# Patient Record
Sex: MALE | Race: WHITE | NOT HISPANIC OR LATINO | Employment: FULL TIME | URBAN - METROPOLITAN AREA
[De-identification: names, ages, dates, MRNs, and addresses within clinical notes are randomized per-mention and may not be internally consistent; named-entity substitution may affect disease eponyms.]

---

## 2019-11-08 ENCOUNTER — HOSPITAL ENCOUNTER (OUTPATIENT)
Dept: LAB | Facility: HOSPITAL | Age: 44
Discharge: HOME | End: 2019-11-08
Attending: PHYSICIAN ASSISTANT

## 2019-11-08 ENCOUNTER — OFFICE VISIT CONVERTED (OUTPATIENT)
Dept: FAMILY MEDICINE CLINIC | Facility: CLINIC | Age: 44
End: 2019-11-08
Attending: PHYSICIAN ASSISTANT

## 2019-11-08 LAB
APPEARANCE UR: CLEAR
BILIRUB UR QL: NEGATIVE
CK SERPL-CCNC: 209 U/L (ref 57–374)
COLOR UR: YELLOW
CONV COLLECTION SOURCE (UA): NORMAL
CONV UROBILINOGEN IN URINE BY AUTOMATED TEST STRIP: 0.2 {EHRLICHU}/DL (ref 0.1–1)
GLUCOSE UR QL: NEGATIVE MG/DL
HGB UR QL STRIP: NEGATIVE
KETONES UR QL STRIP: NEGATIVE MG/DL
LEUKOCYTE ESTERASE UR QL STRIP: NEGATIVE
NITRITE UR QL STRIP: NEGATIVE
PH UR STRIP.AUTO: 5 [PH] (ref 5–8)
PROT UR QL: NEGATIVE MG/DL
SP GR UR: 1.03 (ref 1–1.03)
TESTOST SERPL-MCNC: 267 NG/DL (ref 249–836)

## 2019-11-09 LAB — B BURGDOR IGG+IGM SER-ACNC: <0.91 ISR (ref 0–0.9)

## 2019-11-12 LAB
R RICKETTSI IGG SER QL IA: NEGATIVE
R RICKETTSI IGM TITR SER: 0.42 INDEX (ref 0–0.89)
TESTOSTERONE, FREE: 6.3 PG/ML (ref 6.8–21.5)

## 2019-12-20 ENCOUNTER — HOSPITAL ENCOUNTER (OUTPATIENT)
Dept: LAB | Facility: HOSPITAL | Age: 44
Discharge: HOME | End: 2019-12-20
Attending: PHYSICIAN ASSISTANT

## 2019-12-20 ENCOUNTER — OFFICE VISIT CONVERTED (OUTPATIENT)
Dept: FAMILY MEDICINE CLINIC | Facility: CLINIC | Age: 44
End: 2019-12-20
Attending: PHYSICIAN ASSISTANT

## 2019-12-20 LAB — PSA SERPL-MCNC: 1.22 NG/ML (ref 0–4)

## 2020-03-12 ENCOUNTER — HOSPITAL ENCOUNTER (OUTPATIENT)
Dept: LAB | Facility: HOSPITAL | Age: 45
Discharge: HOME | End: 2020-03-12
Attending: PHYSICIAN ASSISTANT

## 2020-03-12 LAB
ALBUMIN SERPL-MCNC: 3.9 G/DL (ref 3.5–5)
ALBUMIN/GLOB SERPL: 1.1 {RATIO} (ref 1.4–2.6)
ALP SERPL-CCNC: 59 U/L (ref 53–128)
ALT SERPL-CCNC: 50 U/L (ref 10–40)
ANION GAP SERPL CALC-SCNC: 20 MMOL/L (ref 8–19)
AST SERPL-CCNC: 44 U/L (ref 15–50)
BASOPHILS # BLD AUTO: 0.05 10*3/UL (ref 0–0.2)
BASOPHILS NFR BLD AUTO: 0.7 % (ref 0–3)
BILIRUB SERPL-MCNC: 0.35 MG/DL (ref 0.2–1.3)
BUN SERPL-MCNC: 15 MG/DL (ref 5–25)
BUN/CREAT SERPL: 14 {RATIO} (ref 6–20)
CALCIUM SERPL-MCNC: 9.3 MG/DL (ref 8.7–10.4)
CHLORIDE SERPL-SCNC: 106 MMOL/L (ref 99–111)
CONV ABS IMM GRAN: 0.02 10*3/UL (ref 0–0.2)
CONV CO2: 19 MMOL/L (ref 22–32)
CONV IMMATURE GRAN: 0.3 % (ref 0–1.8)
CONV TOTAL PROTEIN: 7.4 G/DL (ref 6.3–8.2)
CREAT UR-MCNC: 1.05 MG/DL (ref 0.7–1.2)
DEPRECATED RDW RBC AUTO: 46.9 FL (ref 35.1–43.9)
EOSINOPHIL # BLD AUTO: 0.26 10*3/UL (ref 0–0.7)
EOSINOPHIL # BLD AUTO: 3.6 % (ref 0–7)
ERYTHROCYTE [DISTWIDTH] IN BLOOD BY AUTOMATED COUNT: 14.4 % (ref 11.6–14.4)
GFR SERPLBLD BASED ON 1.73 SQ M-ARVRAT: >60 ML/MIN/{1.73_M2}
GLOBULIN UR ELPH-MCNC: 3.5 G/DL (ref 2–3.5)
GLUCOSE SERPL-MCNC: 130 MG/DL (ref 70–99)
HCT VFR BLD AUTO: 50.5 % (ref 42–52)
HGB BLD-MCNC: 16.3 G/DL (ref 14–18)
LYMPHOCYTES # BLD AUTO: 2.54 10*3/UL (ref 1–5)
LYMPHOCYTES NFR BLD AUTO: 34.9 % (ref 20–45)
MCH RBC QN AUTO: 28.7 PG (ref 27–31)
MCHC RBC AUTO-ENTMCNC: 32.3 G/DL (ref 33–37)
MCV RBC AUTO: 88.9 FL (ref 80–96)
MONOCYTES # BLD AUTO: 0.67 10*3/UL (ref 0.2–1.2)
MONOCYTES NFR BLD AUTO: 9.2 % (ref 3–10)
NEUTROPHILS # BLD AUTO: 3.73 10*3/UL (ref 2–8)
NEUTROPHILS NFR BLD AUTO: 51.3 % (ref 30–85)
NRBC CBCN: 0 % (ref 0–0.7)
OSMOLALITY SERPL CALC.SUM OF ELEC: 295 MOSM/KG (ref 273–304)
PLATELET # BLD AUTO: 233 10*3/UL (ref 130–400)
PMV BLD AUTO: 10.7 FL (ref 9.4–12.4)
POTASSIUM SERPL-SCNC: 4.3 MMOL/L (ref 3.5–5.3)
PSA SERPL-MCNC: 1.37 NG/ML (ref 0–4)
RBC # BLD AUTO: 5.68 10*6/UL (ref 4.7–6.1)
SODIUM SERPL-SCNC: 141 MMOL/L (ref 135–147)
TESTOST SERPL-MCNC: 502 NG/DL (ref 249–836)
WBC # BLD AUTO: 7.27 10*3/UL (ref 4.8–10.8)

## 2020-03-20 ENCOUNTER — OFFICE VISIT CONVERTED (OUTPATIENT)
Dept: FAMILY MEDICINE CLINIC | Facility: CLINIC | Age: 45
End: 2020-03-20
Attending: NURSE PRACTITIONER

## 2020-06-12 ENCOUNTER — OFFICE VISIT CONVERTED (OUTPATIENT)
Dept: FAMILY MEDICINE CLINIC | Facility: CLINIC | Age: 45
End: 2020-06-12
Attending: PHYSICIAN ASSISTANT

## 2020-07-17 ENCOUNTER — CONVERSION ENCOUNTER (OUTPATIENT)
Dept: GASTROENTEROLOGY | Facility: CLINIC | Age: 45
End: 2020-07-17
Attending: INTERNAL MEDICINE

## 2020-08-26 ENCOUNTER — HOSPITAL ENCOUNTER (OUTPATIENT)
Dept: PREADMISSION TESTING | Facility: HOSPITAL | Age: 45
Discharge: HOME | End: 2020-08-26
Attending: INTERNAL MEDICINE

## 2020-08-27 LAB — SARS-COV-2 RNA SPEC QL NAA+PROBE: NOT DETECTED

## 2020-08-31 ENCOUNTER — HOSPITAL ENCOUNTER (OUTPATIENT)
Dept: GASTROENTEROLOGY | Facility: HOSPITAL | Age: 45
Setting detail: HOSPITAL OUTPATIENT SURGERY
Discharge: HOME | End: 2020-08-31
Attending: INTERNAL MEDICINE

## 2020-09-08 ENCOUNTER — HOSPITAL ENCOUNTER (OUTPATIENT)
Dept: LAB | Facility: HOSPITAL | Age: 45
Discharge: HOME | End: 2020-09-08
Attending: PHYSICIAN ASSISTANT

## 2020-09-08 LAB
ALBUMIN SERPL-MCNC: 4.3 G/DL (ref 3.5–5)
ALBUMIN/GLOB SERPL: 1.3 {RATIO} (ref 1.4–2.6)
ALP SERPL-CCNC: 57 U/L (ref 53–128)
ALT SERPL-CCNC: 39 U/L (ref 10–40)
ANION GAP SERPL CALC-SCNC: 19 MMOL/L (ref 8–19)
AST SERPL-CCNC: 34 U/L (ref 15–50)
BASOPHILS # BLD AUTO: 0.06 10*3/UL (ref 0–0.2)
BASOPHILS NFR BLD AUTO: 0.9 % (ref 0–3)
BILIRUB SERPL-MCNC: 0.67 MG/DL (ref 0.2–1.3)
BUN SERPL-MCNC: 20 MG/DL (ref 5–25)
BUN/CREAT SERPL: 17 {RATIO} (ref 6–20)
CALCIUM SERPL-MCNC: 9.4 MG/DL (ref 8.7–10.4)
CHLORIDE SERPL-SCNC: 105 MMOL/L (ref 99–111)
CONV ABS IMM GRAN: 0.02 10*3/UL (ref 0–0.2)
CONV CO2: 18 MMOL/L (ref 22–32)
CONV IMMATURE GRAN: 0.3 % (ref 0–1.8)
CONV TOTAL PROTEIN: 7.6 G/DL (ref 6.3–8.2)
CREAT UR-MCNC: 1.19 MG/DL (ref 0.7–1.2)
DEPRECATED RDW RBC AUTO: 47.4 FL (ref 35.1–43.9)
EOSINOPHIL # BLD AUTO: 0.18 10*3/UL (ref 0–0.7)
EOSINOPHIL # BLD AUTO: 2.8 % (ref 0–7)
ERYTHROCYTE [DISTWIDTH] IN BLOOD BY AUTOMATED COUNT: 14.6 % (ref 11.6–14.4)
GFR SERPLBLD BASED ON 1.73 SQ M-ARVRAT: >60 ML/MIN/{1.73_M2}
GLOBULIN UR ELPH-MCNC: 3.3 G/DL (ref 2–3.5)
GLUCOSE SERPL-MCNC: 110 MG/DL (ref 70–99)
HCT VFR BLD AUTO: 52.7 % (ref 42–52)
HGB BLD-MCNC: 17.4 G/DL (ref 14–18)
LYMPHOCYTES # BLD AUTO: 2.05 10*3/UL (ref 1–5)
LYMPHOCYTES NFR BLD AUTO: 31.9 % (ref 20–45)
MCH RBC QN AUTO: 28.9 PG (ref 27–31)
MCHC RBC AUTO-ENTMCNC: 33 G/DL (ref 33–37)
MCV RBC AUTO: 87.4 FL (ref 80–96)
MONOCYTES # BLD AUTO: 0.59 10*3/UL (ref 0.2–1.2)
MONOCYTES NFR BLD AUTO: 9.2 % (ref 3–10)
NEUTROPHILS # BLD AUTO: 3.53 10*3/UL (ref 2–8)
NEUTROPHILS NFR BLD AUTO: 54.9 % (ref 30–85)
NRBC CBCN: 0 % (ref 0–0.7)
OSMOLALITY SERPL CALC.SUM OF ELEC: 289 MOSM/KG (ref 273–304)
PLATELET # BLD AUTO: 230 10*3/UL (ref 130–400)
PMV BLD AUTO: 10.5 FL (ref 9.4–12.4)
POTASSIUM SERPL-SCNC: 4.1 MMOL/L (ref 3.5–5.3)
PSA SERPL-MCNC: 1.38 NG/ML (ref 0–4)
RBC # BLD AUTO: 6.03 10*6/UL (ref 4.7–6.1)
SODIUM SERPL-SCNC: 138 MMOL/L (ref 135–147)
TESTOST SERPL-MCNC: 497 NG/DL (ref 249–836)
WBC # BLD AUTO: 6.43 10*3/UL (ref 4.8–10.8)

## 2020-09-11 ENCOUNTER — OFFICE VISIT CONVERTED (OUTPATIENT)
Dept: FAMILY MEDICINE CLINIC | Facility: CLINIC | Age: 45
End: 2020-09-11
Attending: PHYSICIAN ASSISTANT

## 2020-12-11 ENCOUNTER — HOSPITAL ENCOUNTER (OUTPATIENT)
Dept: LAB | Facility: HOSPITAL | Age: 45
Discharge: HOME | End: 2020-12-11
Attending: PHYSICIAN ASSISTANT

## 2020-12-11 LAB
BASOPHILS # BLD AUTO: 0.04 10*3/UL (ref 0–0.2)
BASOPHILS NFR BLD AUTO: 0.7 % (ref 0–3)
CONV ABS IMM GRAN: 0.02 10*3/UL (ref 0–0.2)
CONV IMMATURE GRAN: 0.3 % (ref 0–1.8)
DEPRECATED RDW RBC AUTO: 47 FL (ref 35.1–43.9)
EOSINOPHIL # BLD AUTO: 0.16 10*3/UL (ref 0–0.7)
EOSINOPHIL # BLD AUTO: 2.8 % (ref 0–7)
ERYTHROCYTE [DISTWIDTH] IN BLOOD BY AUTOMATED COUNT: 15.2 % (ref 11.6–14.4)
HCT VFR BLD AUTO: 51.8 % (ref 42–52)
HGB BLD-MCNC: 17 G/DL (ref 14–18)
LYMPHOCYTES # BLD AUTO: 1.88 10*3/UL (ref 1–5)
LYMPHOCYTES NFR BLD AUTO: 32.9 % (ref 20–45)
MCH RBC QN AUTO: 28.3 PG (ref 27–31)
MCHC RBC AUTO-ENTMCNC: 32.8 G/DL (ref 33–37)
MCV RBC AUTO: 86.3 FL (ref 80–96)
MONOCYTES # BLD AUTO: 0.68 10*3/UL (ref 0.2–1.2)
MONOCYTES NFR BLD AUTO: 11.9 % (ref 3–10)
NEUTROPHILS # BLD AUTO: 2.94 10*3/UL (ref 2–8)
NEUTROPHILS NFR BLD AUTO: 51.4 % (ref 30–85)
NRBC CBCN: 0 % (ref 0–0.7)
PLATELET # BLD AUTO: 211 10*3/UL (ref 130–400)
PMV BLD AUTO: 10.1 FL (ref 9.4–12.4)
PSA SERPL-MCNC: 1.38 NG/ML (ref 0–4)
RBC # BLD AUTO: 6 10*6/UL (ref 4.7–6.1)
TESTOST SERPL-MCNC: 520 NG/DL (ref 249–836)
WBC # BLD AUTO: 5.72 10*3/UL (ref 4.8–10.8)

## 2020-12-14 LAB — CONV ESTROGENS, TOTAL, SERUM: 183 PG/ML (ref 40–115)

## 2020-12-15 ENCOUNTER — OFFICE VISIT CONVERTED (OUTPATIENT)
Dept: FAMILY MEDICINE CLINIC | Facility: CLINIC | Age: 45
End: 2020-12-15
Attending: PHYSICIAN ASSISTANT

## 2021-04-01 ENCOUNTER — HOSPITAL ENCOUNTER (OUTPATIENT)
Dept: VACCINE CLINIC | Facility: HOSPITAL | Age: 46
Discharge: HOME | End: 2021-04-01
Attending: INTERNAL MEDICINE

## 2021-04-23 ENCOUNTER — HOSPITAL ENCOUNTER (OUTPATIENT)
Dept: VACCINE CLINIC | Facility: HOSPITAL | Age: 46
Discharge: HOME | End: 2021-04-23
Attending: INTERNAL MEDICINE

## 2021-05-10 NOTE — H&P
History and Physical      Patient Name: Hardik Woo   Patient ID: [id number]   Sex: Male   YOB: 1975    Primary Care Provider: Nathaniel Johnson PA-C   Referring Provider: Nathaniel Johnson PA-C    Visit Date: July 17, 2020    Provider: Prudencio Ghosh MD   Location: [facility]   Location Address: [address] [place] [address]  [id number]   Location Phone: [phone]          Chief Complaint  · Surgical History and Physical  · Screening Colonoscopy      History Of Present Illness  NON-INPATIENT HISTORY AND PHYSICAL  Allergies: NO KNOWN DRUG ALLERGIES   Chief Complaint/History of Present Illness: Screening Colonoscopy, History of Colon Polyps   Colon Recall: No   Failed Outpatient Treatment/Contraindications: N/A   Current Medications: Suprep Bowel Prep Kit 17.5-3.13-1.6 gram oral recon soln and testosterone cypionate 200 mg/mL intramuscular oil   Significant Past Medical History: Hypogonadism, male and Screening for colon cancer   Significant Family Medical History: No Family History of Colon Cancer   Significant Past Surgical History: Colonoscopy   Previous Colonoscopy: Yes YEAR: 2011 By Whom: Riki Pfeiffer MD   Previous EGD: No   PHYSICAL EXAM:  Heart: Regular Rate and Rhythm   Lungs: Breathing Unlabored           Assessment  · Preoperative examination     V72.84/Z01.818  · Screening for colon cancer     V76.51/Z12.11  · History of colonic polyps     V12.72/Z86.010      Plan  · Orders  o Consent for Colonoscopy Screening -Possible risk/complications, benefits, and alternatives to surgical or invasive procedure have been explained to patient and/or legal gaurdian. -Patient has been evaluated and can tolerate anethesia and/or sedation. Risk, benefits, and alternatives to anesthesia and sedation have been explained to patient or legal gaurdian. () - V72.84/Z01.818, V76.51/Z12.11, V12.72/Z86.010 - 08/31/2020  · Medications  o Medications have been Reconciled  o Transition of Care or Provider  Policy  · Instructions  o ****Surgical Orders****  o ***************  o Outpatient  o ***************  o RISK AND BENEFITS:  o Possible risks/complications, benefits and alternatives to surgical or invasive procedure have been explained to the patient and/or legal guardian.  o Patient has been evaluated and can tolerate anesthesia and/or sedation. Risks, benefits, and alternatives to anesthesia and sedation have been explained to the patient and/or legal guardian.  o ***************  o PREP: Per protocol  o IV: Per Anesthesia  o The above History and Physical Examination has been completed within 30 days of admission.  o This note has been transcribed by ALEJANDRO Arteaga MA. I have read and agree with the findings in this note.  o Electronically Identified Patient Education Materials Provided Electronically  · Disposition  o Call or Return if symptoms worsen or persist.            Electronically Signed by: John Arteaga, -Author on July 17, 2020 10:39:38 AM

## 2021-05-13 NOTE — PROGRESS NOTES
Progress Note      Patient Name: Hardik Woo   Patient ID: [id number]   Sex: Male   YOB: 1975    Primary Care Provider: Nathaniel Johnson PA-C   Referring Provider: Nathaniel Johnson PA-C    Visit Date: June 12, 2020    Provider: Nathaniel Johnson PA-C   [facility]   Location Address: [address] [place] [address], [address]  [place], [place]  [id number]   Location Phone: [phone]          Chief Complaint  · Left knee pain      History Of Present Illness  Hardik Woo is a 44 year old male who presents for evaluation and treatment of: left knee pain.      pt presents today for left knee pain.    pt stated 2 weeks ago ehile working in the yard he hurt his knee to the fact he was unable to bear weight on the leg. pt stated it is getting better over time, has been putting ice on it.  Patient states that he was getting up from working in his yard and he had instant pain in his knee left knee it nearly buckled on him he states that he was unable to walk for the next day.  He states improved a little bit over time but it continues to bother him.  It does wake him up when he rolls over.    Pt will restart testosterone injections    Pt also needs colon - screening since last was over 5 years ago.               Past Medical History  Disease Name Date Onset Notes   *No Pertinent Past Medical History --  --    Hypogonadism, male 12/22/2019 --    Screening for colon cancer 2014 --          Allergy List  Allergen Name Date Reaction Notes   NO KNOWN DRUG ALLERGIES --  --  --          Family Medical History  Disease Name Relative/Age Notes   Brain Neoplasm, Malignant Uncle/   --    Heart Disease Father/  Grandfather (maternal)/  Mother/   --    Diabetes, unspecified type Mother/   --          Social History  Finding Status Start/Stop Quantity Notes   Alcohol Light 21/-- --  03/20/2020 - 11/08/2019 - socially    Tobacco Never --/-- --  11/08/2019 -          Immunizations  NameDate Admin Mfg Trade Name Lot Number Route  "Inj VIS Given VIS Publication   Tdap11/08/2019 SKB BOOSTRIX [id number] IM LD 11/08/2019    Comments:          Review of Systems  · Constitutional  o Denies  o : fever, fatigue, weight loss, weight gain  · Cardiovascular  o Denies  o : lower extremity edema, claudication, chest pressure, palpitations  · Respiratory  o Denies  o : shortness of breath, wheezing, cough, hemoptysis, dyspnea on exertion  · Gastrointestinal  o Denies  o : nausea, vomiting, diarrhea, constipation, abdominal pain      Vitals  Date Time BP Position Site L\R Cuff Size HR RR TEMP (F) WT  HT  BMI kg/m2 BSA m2 O2 Sat HC       06/12/2020 10:51 /69 Sitting    90 - R   273lbs 2oz 5'  8\" 41.53 2.44 97 %          Physical Examination  · Constitutional  o Appearance  o : overweight, well developed  · Head and Face  o Head  o : normocephalic, atraumatic  · Ears, Nose, Mouth and Throat  o Ears  o :   § External Ears  § : external auditory canal appearance normal, no discharge present  § Otoscopic Examination  § : tympanic membranes pearly white/gray bilaterally  o Nose  o :   § External Nose  § : no lesions noted  § Nasopharynx  § : no discharge present  o Oral Cavity  o :   § Oral Mucosa  § : oral mucosa light pink  o Throat  o :   § Oropharynx  § : tonsils without exudate, no palatal petechiae  · Neck  o Inspection/Palpation  o : normal appearance, no masses or tenderness, trachea midline  o Thyroid  o : gland size normal, nontender, no nodules or masses present on palpation  · Respiratory  o Respiratory Effort  o : breathing unlabored  o Inspection of Chest  o : chest rise symmetric bilaterally  o Auscultation of Lungs  o : clear to auscultation bilaterally throughout inspiration and expiration  · Cardiovascular  o Heart  o :   § Auscultation of Heart  § : regular rate and rhythm, no murmurs, gallops or rubs  o Peripheral Vascular System  o :   § Extremities  § : no edema  · Lymphatic  o Neck  o : no cervical lymphadenopathy, no supraclavicular " lymphadenopathy  · Psychiatric  o Mood and Affect  o : mood normal, affect appropriate     Left Knee - full rom, left lateral aspect tend, no edema, no discoloration           Assessment  · Screening for colon cancer     V76.51/Z12.11  · Knee pain, left     719.46/M25.562  · Low testosterone in male     790.99/R79.89      Plan  · Orders  o COLONOSCOPY REFERRAL (COLON) - V76.51/Z12.11 - 06/12/2020  o CBC with Auto Diff [facility] ([id number]) - - 09/12/2020  o CMP [facility] ([id number]) - - 09/12/2020  o ACO-39: Current medications updated and reviewed () - - 06/12/2020  o ACO-14: Influenza immunization administered or previously received () - - 06/12/2020  o Knee (Left) 3 views X-Ray [facility] Preferred View ([id number]) - - 06/12/2020  o PSA ultrasensitive DIAGNOSTIC [facility] ([id number]) - - 09/12/2020  o Testosterone (Total) (35701) - - 09/12/2020  · Medications  o testosterone cypionate 200 mg/mL intramuscular oil   SIG: inject 1 milliliter by intramuscular route every 2 weeks   DISP: (2) 1 ml vial with 2 refills  Prescribed on 06/12/2020     o Medications have been Reconciled  o Transition of Care or Provider Policy  · Instructions  o Take all medications as prescribed/directed.  o Patient instructed/educated on their diet and exercise program.  o Patient was educated/instructed on their diagnosis, treatment and medications prior to discharge from the clinic today.  o Patient counseled to reduce calorie intake.  o Patient was instructed to exercise regularly.  o Discussed Covid-19 precautions including, but not limited to, social distancing, avoid touching your face, and hand washing.   · Disposition  o Call or Return if symptoms worsen or persist.  o F/U in 3 months.  o Care Transition            Electronically Signed by: Nathaniel Johnson PA-C -Author on June 12, 2020 01:32:24 PM

## 2021-05-13 NOTE — PROGRESS NOTES
Progress Note      Patient Name: Hardik Woo   Patient ID: [id number]   Sex: Male   YOB: 1975    Primary Care Provider: Nathaniel Johnson PA-C   Referring Provider: Nathaniel Johnson PA-C    Visit Date: September 11, 2020    Provider: Nathaniel Johnson PA-C   [facility]   Location Address: [address] [place] [address], [address]  [place], [place]  [id number]   Location Phone: [phone]          Chief Complaint  · 3 month follow up      History Of Present Illness  Hardik Woo is a 44 year old male who presents for evaluation and treatment of:      Patient is here today for a 3 month follow up about his testosterone injections. Patient has no other concerns to discuss.     Last labs were done on 9/7/20    No CP, SOA, HA    Pt will donate blood to keep hgb/hct undercontrol    He is feeling better.  Gives himself injection every two weeks.    UBALDO and UDS are utd             Past Medical History  Disease Name Date Onset Notes   *No Pertinent Past Medical History --  --    Hypogonadism, male 12/22/2019 --    Screening for colon cancer 2014 --          Past Surgical History  Procedure Name Date Notes   Colonoscopy 2011 2020 --          Medication List  Name Date Started Instructions   testosterone cypionate 200 mg/mL intramuscular oil 09/11/2020 inject 1 milliliter by intramuscular route every 2 weeks for 30 days         Allergy List  Allergen Name Date Reaction Notes   NO KNOWN DRUG ALLERGIES --  --  --          Family Medical History  Disease Name Relative/Age Notes   Brain Neoplasm, Malignant Uncle/   --    Heart Disease Father/  Grandfather (maternal)/  Mother/   --    Diabetes, unspecified type Mother/   --    - No Family History of Colorectal Cancer  --          Social History  Finding Status Start/Stop Quantity Notes   Alcohol Light 21/-- --  03/20/2020 - 11/08/2019 - socially    Tobacco Never --/-- --  11/08/2019 -          Immunizations  NameDate Admin Mfg Trade Name Lot Number  "Route Inj VIS Given VIS Publication   Tdap11/08/2019 SKB BOOSTRIX [id number] IM LD 11/08/2019    Comments:          Review of Systems  · Constitutional  o Admits  o : weight loss  o Denies  o : fever, fatigue, weight gain  · Cardiovascular  o Denies  o : lower extremity edema, claudication, chest pressure, palpitations  · Respiratory  o Denies  o : shortness of breath, wheezing, cough, hemoptysis, dyspnea on exertion  · Gastrointestinal  o Denies  o : nausea, vomiting, diarrhea, constipation, abdominal pain      Vitals  Date Time BP Position Site L\R Cuff Size HR RR TEMP (F) WT  HT  BMI kg/m2 BSA m2 O2 Sat HC       09/11/2020 03:43 /63 Sitting    79 - R   268lbs 8oz 5'  9\" 39.65 2.43 95 %          Physical Examination  · Constitutional  o Appearance  o : overweight, well developed  · Head and Face  o Head  o : normocephalic, atraumatic  · Neck  o Inspection/Palpation  o : normal appearance, no masses or tenderness, trachea midline  o Thyroid  o : gland size normal, nontender, no nodules or masses present on palpation  · Respiratory  o Respiratory Effort  o : breathing unlabored  o Inspection of Chest  o : chest rise symmetric bilaterally  o Auscultation of Lungs  o : clear to auscultation bilaterally throughout inspiration and expiration  · Cardiovascular  o Heart  o :   § Auscultation of Heart  § : regular rate and rhythm, no murmurs, gallops or rubs  o Peripheral Vascular System  o :   § Extremities  § : no edema  · Lymphatic  o Neck  o : no cervical lymphadenopathy, no supraclavicular lymphadenopathy  · Psychiatric  o Mood and Affect  o : mood normal, affect appropriate              Assessment  · Hypogonadism, male     257.2/E29.1  · Obesity     278.00/E66.9  · Low testosterone in male     790.99/R79.89    Problems Reconciled  Plan  · Orders  o PSA ultrasenstive diagnostic [facility] ([id number]) - 257.2/E29.1 - 12/11/2020  o Testosterone (Total) (78778) - 257.2/E29.1 - 12/11/2020  o Estrogen, total (65768) - 257.2/E29.1 - " 12/11/2020  o CBC with Auto Diff [facility] ([id number]) - 257.2/E29.1 - 12/11/2020  o ACO-39: Current medications updated and reviewed () - - 09/11/2020  · Medications  o testosterone cypionate 200 mg/mL intramuscular oil   SIG: inject 1 milliliter by intramuscular route every 2 weeks for 30 days   DISP: (2) 1 ml vial with 2 refills  Refilled on 09/11/2020     o Medications have been Reconciled  o Transition of Care or Provider Policy  · Instructions  o Take all medications as prescribed/directed.  o Patient instructed/educated on their diet and exercise program.  o Patient was educated/instructed on their diagnosis, treatment and medications prior to discharge from the clinic today.  o Patient counseled to reduce calorie intake.  o Patient was instructed to exercise regularly.  o Discussed Covid-19 precautions including, but not limited to, social distancing, avoid touching your face, and hand washing.   · Disposition  o Call or Return if symptoms worsen or persist.  o F/U in 3 months.  o Care Transition            Electronically Signed by: Nathaniel Johnson PA-C -Author on September 16, 2020 09:28:55 AM

## 2021-05-14 VITALS
HEIGHT: 69 IN | HEART RATE: 79 BPM | SYSTOLIC BLOOD PRESSURE: 105 MMHG | BODY MASS INDEX: 39.77 KG/M2 | WEIGHT: 268.5 LBS | DIASTOLIC BLOOD PRESSURE: 63 MMHG | OXYGEN SATURATION: 95 %

## 2021-05-14 VITALS
WEIGHT: 272.12 LBS | HEART RATE: 89 BPM | HEIGHT: 69 IN | DIASTOLIC BLOOD PRESSURE: 95 MMHG | SYSTOLIC BLOOD PRESSURE: 136 MMHG | BODY MASS INDEX: 40.3 KG/M2 | OXYGEN SATURATION: 97 %

## 2021-05-14 NOTE — PROGRESS NOTES
Progress Note      Patient Name: Hardik Woo   Patient ID: [id number]   Sex: Male   YOB: 1975    Primary Care Provider: Nathaniel Johnson PA-C   Referring Provider: Nathaniel Johnson PA-C    Visit Date: December 15, 2020    Provider: Nathaniel Johnson PA-C   Location: [facility]   Location Address: [address] [place] [address], [address]  [place], [place]  [id number]   Location Phone: [phone]          Chief Complaint  · routine follow up  · discuss labs      History Of Present Illness  Hardik Woo is a 45 year old male who presents for evaluation and treatment of: routine follow up.      pt presents today for routine follow up on hypogonadism.    pt states he would like to discuss estrogen labs.    pt states no new issues or complaints to discuss    Labs 12/20  flu 10/20    No CP, SOA, HA  Pt had recent labs which reveals elevated estrogen levels.    Pt is doing well overall    No CP, SOA, HA  Discussed testosterone use at length       Past Medical History  Disease Name Date Onset Notes   *No Pertinent Past Medical History --  --    Hypogonadism, male 12/22/2019 --    Screening for colon cancer 2014 --          Past Surgical History  Procedure Name Date Notes   Colonoscopy 2011 2020 --          Medication List  Name Date Started Instructions   testosterone cypionate 200 mg/mL intramuscular oil 12/14/2020 inject 1 milliliter by intramuscular route every 2 weeks for 30 days         Allergy List  Allergen Name Date Reaction Notes   NO KNOWN DRUG ALLERGIES --  --  --        Allergies Reconciled  Family Medical History  Disease Name Relative/Age Notes   Brain Neoplasm, Malignant Uncle/   --    Heart Disease Father/  Grandfather (maternal)/  Mother/   --    Diabetes, unspecified type Mother/   --    - No Family History of Colorectal Cancer  --          Social History  Finding Status Start/Stop Quantity Notes   Alcohol Light 21/-- --  03/20/2020 - 11/08/2019 - socially    Tobacco Never --/-- --   "11/08/2019 -          Immunizations  NameDate Admin Mfg Trade Name Lot Number Route Inj VIS Given VIS Publication   Tdap11/08/2019 SKB BOOSTRIX [id number] IM LD 11/08/2019    Comments:          Review of Systems  · Constitutional  o Denies  o : fever, fatigue, weight loss, weight gain  · Cardiovascular  o Denies  o : lower extremity edema, claudication, chest pressure, palpitations  · Respiratory  o Denies  o : shortness of breath, wheezing, cough, hemoptysis, dyspnea on exertion  · Gastrointestinal  o Denies  o : nausea, vomiting, diarrhea, constipation, abdominal pain      Vitals  Date Time BP Position Site L\R Cuff Size HR RR TEMP (F) WT  HT  BMI kg/m2 BSA m2 O2 Sat FR L/min FiO2 HC       12/15/2020 03:26 /95 Sitting    89 - R   272lbs 2oz 5'  9\" 40.19 2.45 97 %            Physical Examination  · Constitutional  o Appearance  o : overweight, well developed  · Head and Face  o Head  o : normocephalic, atraumatic  · Neck  o Inspection/Palpation  o : normal appearance, no masses or tenderness, trachea midline  o Thyroid  o : gland size normal, nontender, no nodules or masses present on palpation  · Respiratory  o Respiratory Effort  o : breathing unlabored  o Inspection of Chest  o : chest rise symmetric bilaterally  o Auscultation of Lungs  o : clear to auscultation bilaterally throughout inspiration and expiration  · Cardiovascular  o Heart  o :   § Auscultation of Heart  § : regular rate and rhythm, no murmurs, gallops or rubs  o Peripheral Vascular System  o :   § Extremities  § : no edema  · Lymphatic  o Neck  o : no cervical lymphadenopathy, no supraclavicular lymphadenopathy  · Psychiatric  o Mood and Affect  o : mood normal, affect appropriate          Assessment  · Hypogonadism, male     257.2/E29.1  · Need for influenza vaccination     V04.81/Z23  · Low testosterone in male     790.99/R79.89  · Estrogen increased     256.0/E28.0      Plan  · Orders  o ACO-14: Influenza immunization was not administered " for reasons documented () - V04.81/Z23 - 12/15/2020   rcvd 10/20  o CBC with Auto Diff [facility] ([id number]) - - 06/15/2021  o CMP [facility] ([id number]) - - 06/15/2021  o ACO-39: Current medications updated and reviewed (1159F, ) - - 12/15/2020  o PSA ultrasensitive DIAGNOSTIC [facility] ([id number]) - - 06/15/2021  o Testosterone (Total) (78470) - - 06/15/2021  o Estrogen (Total) (05914) - - 06/15/2021  · Medications  o Arimidex 1 mg oral tablet   SIG: take 1 tablet (1 mg) by oral route once daily   DISP: (14) Tablet with 0 refills  Prescribed on 12/15/2020     o Medications have been Reconciled  o Transition of Care or Provider Policy  · Instructions  o Obtained a written consent for UBALDO query. Discussed the risk and benefits of the use of controlled substances with the patient, including the risk of tolerance and drug dependence. The patient has been counseled on the need to have an exit strategy, including potentially discontinuing the use of controlled substances. UBALDO has or will be reviewed as soon as it becomes avaliable.  o See note for indication of controlled substance. Ubaldo and drug screen have been reviewed. Controlled substance agreement signed and scanned into chart. After discussion of risks and benefits of medication, I have determined patient is suitable for Rx while demonstrating the ability to safely follow and administer the medication plan. Patient understands the expectation that medication directions cannot be adjusted without a provider's written approval.   o Take all medications as prescribed/directed.  o Patient instructed/educated on their diet and exercise program.  o Patient was educated/instructed on their diagnosis, treatment and medications prior to discharge from the clinic today.  o Patient counseled to reduce calorie intake.  o Patient was instructed to exercise regularly.  o Discussed Covid-19 precautions including, but not limited to, social distancing, avoid touching your face, and hand  washing.   · Disposition  o Call or Return if symptoms worsen or persist.  o F/U in 6 months  o Care Transition            Electronically Signed by: Nathaniel Johnson PA-C -Author on December 16, 2020 11:04:58 AM

## 2021-05-15 VITALS
DIASTOLIC BLOOD PRESSURE: 90 MMHG | HEIGHT: 68 IN | SYSTOLIC BLOOD PRESSURE: 162 MMHG | HEART RATE: 95 BPM | WEIGHT: 271.5 LBS | OXYGEN SATURATION: 97 % | BODY MASS INDEX: 41.15 KG/M2

## 2021-05-15 VITALS
HEIGHT: 68 IN | SYSTOLIC BLOOD PRESSURE: 140 MMHG | HEART RATE: 81 BPM | OXYGEN SATURATION: 99 % | DIASTOLIC BLOOD PRESSURE: 76 MMHG | WEIGHT: 279 LBS | BODY MASS INDEX: 42.28 KG/M2

## 2021-05-15 VITALS
BODY MASS INDEX: 41.39 KG/M2 | DIASTOLIC BLOOD PRESSURE: 69 MMHG | SYSTOLIC BLOOD PRESSURE: 125 MMHG | OXYGEN SATURATION: 97 % | WEIGHT: 273.12 LBS | HEIGHT: 68 IN | HEART RATE: 90 BPM

## 2021-05-28 ENCOUNTER — HOSPITAL ENCOUNTER (OUTPATIENT)
Dept: LAB | Facility: HOSPITAL | Age: 46
Discharge: HOME | End: 2021-05-28
Attending: PHYSICIAN ASSISTANT

## 2021-05-28 LAB
ALBUMIN SERPL-MCNC: 3.8 G/DL (ref 3.5–5)
ALBUMIN/GLOB SERPL: 1 {RATIO} (ref 1.4–2.6)
ALP SERPL-CCNC: 75 U/L (ref 53–128)
ALT SERPL-CCNC: 20 U/L (ref 10–40)
ANION GAP SERPL CALC-SCNC: 17 MMOL/L (ref 8–19)
AST SERPL-CCNC: 22 U/L (ref 15–50)
BASOPHILS # BLD AUTO: 0.05 10*3/UL (ref 0–0.2)
BASOPHILS NFR BLD AUTO: 0.7 % (ref 0–3)
BILIRUB SERPL-MCNC: 0.44 MG/DL (ref 0.2–1.3)
BUN SERPL-MCNC: 9 MG/DL (ref 5–25)
BUN/CREAT SERPL: 9 {RATIO} (ref 6–20)
CALCIUM SERPL-MCNC: 9.1 MG/DL (ref 8.7–10.4)
CHLORIDE SERPL-SCNC: 103 MMOL/L (ref 99–111)
CONV ABS IMM GRAN: 0.03 10*3/UL (ref 0–0.2)
CONV CO2: 21 MMOL/L (ref 22–32)
CONV IMMATURE GRAN: 0.4 % (ref 0–1.8)
CONV TOTAL PROTEIN: 7.8 G/DL (ref 6.3–8.2)
CREAT UR-MCNC: 1.03 MG/DL (ref 0.7–1.2)
DEPRECATED RDW RBC AUTO: 46.8 FL (ref 35.1–43.9)
EOSINOPHIL # BLD AUTO: 0.33 10*3/UL (ref 0–0.7)
EOSINOPHIL # BLD AUTO: 4.5 % (ref 0–7)
ERYTHROCYTE [DISTWIDTH] IN BLOOD BY AUTOMATED COUNT: 14.7 % (ref 11.6–14.4)
GFR SERPLBLD BASED ON 1.73 SQ M-ARVRAT: >60 ML/MIN/{1.73_M2}
GLOBULIN UR ELPH-MCNC: 4 G/DL (ref 2–3.5)
GLUCOSE SERPL-MCNC: 93 MG/DL (ref 70–99)
HCT VFR BLD AUTO: 48.7 % (ref 42–52)
HGB BLD-MCNC: 15.9 G/DL (ref 14–18)
LYMPHOCYTES # BLD AUTO: 2.92 10*3/UL (ref 1–5)
LYMPHOCYTES NFR BLD AUTO: 39.5 % (ref 20–45)
MCH RBC QN AUTO: 28.3 PG (ref 27–31)
MCHC RBC AUTO-ENTMCNC: 32.6 G/DL (ref 33–37)
MCV RBC AUTO: 86.7 FL (ref 80–96)
MONOCYTES # BLD AUTO: 0.83 10*3/UL (ref 0.2–1.2)
MONOCYTES NFR BLD AUTO: 11.2 % (ref 3–10)
NEUTROPHILS # BLD AUTO: 3.23 10*3/UL (ref 2–8)
NEUTROPHILS NFR BLD AUTO: 43.7 % (ref 30–85)
NRBC CBCN: 0 % (ref 0–0.7)
OSMOLALITY SERPL CALC.SUM OF ELEC: 282 MOSM/KG (ref 273–304)
PLATELET # BLD AUTO: 248 10*3/UL (ref 130–400)
PMV BLD AUTO: 9.8 FL (ref 9.4–12.4)
POTASSIUM SERPL-SCNC: 3.8 MMOL/L (ref 3.5–5.3)
PSA SERPL-MCNC: 1.52 NG/ML (ref 0–4)
RBC # BLD AUTO: 5.62 10*6/UL (ref 4.7–6.1)
SODIUM SERPL-SCNC: 137 MMOL/L (ref 135–147)
TESTOST SERPL-MCNC: 791 NG/DL (ref 249–836)
WBC # BLD AUTO: 7.39 10*3/UL (ref 4.8–10.8)

## 2021-06-07 ENCOUNTER — TRANSCRIBE ORDERS (OUTPATIENT)
Dept: ADMINISTRATIVE | Facility: HOSPITAL | Age: 46
End: 2021-06-07

## 2021-06-07 ENCOUNTER — LAB (OUTPATIENT)
Dept: LAB | Facility: HOSPITAL | Age: 46
End: 2021-06-07

## 2021-06-07 DIAGNOSIS — R79.89 HYPOURICEMIA: ICD-10-CM

## 2021-06-07 DIAGNOSIS — E29.1 HYPOGONADISM IN MALE: Primary | ICD-10-CM

## 2021-06-07 DIAGNOSIS — Z23 NEED FOR PROPHYLACTIC VACCINATION AND INOCULATION AGAINST CHOLERA ALONE: ICD-10-CM

## 2021-06-07 DIAGNOSIS — E29.1 HYPOGONADISM IN MALE: ICD-10-CM

## 2021-06-07 LAB
ALBUMIN SERPL-MCNC: 3.8 G/DL (ref 3.5–5.2)
ALBUMIN/GLOB SERPL: 1.1 G/DL
ALP SERPL-CCNC: 67 U/L (ref 39–117)
ALT SERPL W P-5'-P-CCNC: 15 U/L (ref 1–41)
ANION GAP SERPL CALCULATED.3IONS-SCNC: 7 MMOL/L (ref 5–15)
AST SERPL-CCNC: 16 U/L (ref 1–40)
BASOPHILS # BLD AUTO: 0.06 10*3/MM3 (ref 0–0.2)
BASOPHILS NFR BLD AUTO: 0.9 % (ref 0–1.5)
BILIRUB SERPL-MCNC: 0.5 MG/DL (ref 0–1.2)
BUN SERPL-MCNC: 12 MG/DL (ref 6–20)
BUN/CREAT SERPL: 10.7 (ref 7–25)
CALCIUM SPEC-SCNC: 8.9 MG/DL (ref 8.6–10.5)
CHLORIDE SERPL-SCNC: 105 MMOL/L (ref 98–107)
CO2 SERPL-SCNC: 26 MMOL/L (ref 22–29)
CONV ESTROGENS, TOTAL, SERUM: 110 PG/ML (ref 40–115)
CREAT SERPL-MCNC: 1.12 MG/DL (ref 0.76–1.27)
DEPRECATED RDW RBC AUTO: 43.8 FL (ref 37–54)
EOSINOPHIL # BLD AUTO: 0.2 10*3/MM3 (ref 0–0.4)
EOSINOPHIL NFR BLD AUTO: 3.1 % (ref 0.3–6.2)
ERYTHROCYTE [DISTWIDTH] IN BLOOD BY AUTOMATED COUNT: 13.7 % (ref 12.3–15.4)
GFR SERPL CREATININE-BSD FRML MDRD: 70 ML/MIN/1.73
GLOBULIN UR ELPH-MCNC: 3.4 GM/DL
GLUCOSE SERPL-MCNC: 90 MG/DL (ref 65–99)
HCT VFR BLD AUTO: 49.3 % (ref 37.5–51)
HGB BLD-MCNC: 16.3 G/DL (ref 13–17.7)
IMM GRANULOCYTES # BLD AUTO: 0.02 10*3/MM3 (ref 0–0.05)
IMM GRANULOCYTES NFR BLD AUTO: 0.3 % (ref 0–0.5)
LYMPHOCYTES # BLD AUTO: 1.93 10*3/MM3 (ref 0.7–3.1)
LYMPHOCYTES NFR BLD AUTO: 30 % (ref 19.6–45.3)
MCH RBC QN AUTO: 29 PG (ref 26.6–33)
MCHC RBC AUTO-ENTMCNC: 33.1 G/DL (ref 31.5–35.7)
MCV RBC AUTO: 87.7 FL (ref 79–97)
MONOCYTES # BLD AUTO: 0.63 10*3/MM3 (ref 0.1–0.9)
MONOCYTES NFR BLD AUTO: 9.8 % (ref 5–12)
NEUTROPHILS NFR BLD AUTO: 3.6 10*3/MM3 (ref 1.7–7)
NEUTROPHILS NFR BLD AUTO: 55.9 % (ref 42.7–76)
NRBC BLD AUTO-RTO: 0 /100 WBC (ref 0–0.2)
PLATELET # BLD AUTO: 262 10*3/MM3 (ref 140–450)
PMV BLD AUTO: 10.1 FL (ref 6–12)
POTASSIUM SERPL-SCNC: 4.3 MMOL/L (ref 3.5–5.2)
PROT SERPL-MCNC: 7.2 G/DL (ref 6–8.5)
PSA SERPL-MCNC: 1.33 NG/ML (ref 0–4)
RBC # BLD AUTO: 5.62 10*6/MM3 (ref 4.14–5.8)
SODIUM SERPL-SCNC: 138 MMOL/L (ref 136–145)
TESTOST SERPL-MCNC: 1045 NG/DL (ref 249–836)
WBC # BLD AUTO: 6.44 10*3/MM3 (ref 3.4–10.8)

## 2021-06-07 PROCEDURE — 82672 ASSAY OF ESTROGEN: CPT | Performed by: PHYSICIAN ASSISTANT

## 2021-06-07 PROCEDURE — 36415 COLL VENOUS BLD VENIPUNCTURE: CPT

## 2021-06-07 PROCEDURE — 80053 COMPREHEN METABOLIC PANEL: CPT | Performed by: PHYSICIAN ASSISTANT

## 2021-06-07 PROCEDURE — 84153 ASSAY OF PSA TOTAL: CPT | Performed by: PHYSICIAN ASSISTANT

## 2021-06-07 PROCEDURE — 84403 ASSAY OF TOTAL TESTOSTERONE: CPT | Performed by: PHYSICIAN ASSISTANT

## 2021-06-07 PROCEDURE — 85025 COMPLETE CBC W/AUTO DIFF WBC: CPT | Performed by: PHYSICIAN ASSISTANT

## 2021-06-15 LAB — ESTROGEN SERPL-MCNC: 368 PG/ML (ref 40–115)

## 2021-06-18 ENCOUNTER — OFFICE VISIT (OUTPATIENT)
Dept: FAMILY MEDICINE CLINIC | Facility: CLINIC | Age: 46
End: 2021-06-18

## 2021-06-18 VITALS
HEIGHT: 69 IN | DIASTOLIC BLOOD PRESSURE: 76 MMHG | SYSTOLIC BLOOD PRESSURE: 133 MMHG | WEIGHT: 271.4 LBS | HEART RATE: 87 BPM | OXYGEN SATURATION: 98 % | BODY MASS INDEX: 40.2 KG/M2

## 2021-06-18 DIAGNOSIS — E66.01 CLASS 3 SEVERE OBESITY DUE TO EXCESS CALORIES WITH SERIOUS COMORBIDITY AND BODY MASS INDEX (BMI) OF 40.0 TO 44.9 IN ADULT (HCC): Primary | Chronic | ICD-10-CM

## 2021-06-18 DIAGNOSIS — E29.1 HYPOGONADISM, MALE: ICD-10-CM

## 2021-06-18 PROBLEM — E66.9 OBESE: Status: ACTIVE | Noted: 2021-04-19

## 2021-06-18 PROBLEM — E66.813 CLASS 3 SEVERE OBESITY DUE TO EXCESS CALORIES WITH SERIOUS COMORBIDITY AND BODY MASS INDEX (BMI) OF 40.0 TO 44.9 IN ADULT: Status: ACTIVE | Noted: 2021-04-19

## 2021-06-18 PROCEDURE — 99213 OFFICE O/P EST LOW 20 MIN: CPT | Performed by: PHYSICIAN ASSISTANT

## 2021-06-18 RX ORDER — TESTOSTERONE CYPIONATE 200 MG/ML
200 INJECTION, SOLUTION INTRAMUSCULAR
Qty: 2 ML | Refills: 0 | Status: SHIPPED | OUTPATIENT
Start: 2021-06-18 | End: 2021-07-16 | Stop reason: SDUPTHER

## 2021-06-18 RX ORDER — TESTOSTERONE CYPIONATE 200 MG/ML
200 INJECTION, SOLUTION INTRAMUSCULAR
COMMUNITY
Start: 2021-06-01 | End: 2021-06-18 | Stop reason: SDUPTHER

## 2021-06-18 NOTE — PROGRESS NOTES
"Answers for HPI/ROS submitted by the patient on 6/11/2021  What is the primary reason for your visit?: Other  Please describe your symptoms.: Follow up for Testostrone.  Have you had these symptoms before?: No  How long have you been having these symptoms?: 1-4 days    Chief Complaint  Hypogonadism    Subjective          Hardik Woo presents to [facility]  History of Present Illness    Hardik Woo is a 45 y.o. male who presents today to discuss recent testosterone levels.     Pt notes he would like to stay on testosterone injections, he feels much better with them. Pt is okay with cutting back on injections due to elevated level.     Last testosterone level (6/7/21)- 1045  Last PSA-1.33    Pt fell in Feb and fx his left leg tib/fib with vika    Pt has received both rounds of COVID-19 vaccine through his work, [employer]    Pt had recent labs which revealed elevated testosterone,but he had just given himself his injection.  Next check he will wait at least 7 days.    Disc his elevated estrogen level.        Objective   Vital Signs:   /76 (BP Location: Right arm)   Pulse 87   Ht 175.3 cm (69\")   Wt 123 kg (271 lb 6.4 oz)   SpO2 98%   BMI 40.08 kg/m²     Physical Exam  Vitals and nursing note reviewed.   Constitutional:       Appearance: Normal appearance. He is obese.   HENT:      Head: Normocephalic and atraumatic.   Cardiovascular:      Rate and Rhythm: Normal rate and regular rhythm.   Pulmonary:      Effort: Pulmonary effort is normal.      Breath sounds: Normal breath sounds.   Musculoskeletal:      Cervical back: Neck supple.      Right lower leg: No edema.      Left lower leg: Edema present.   Neurological:      Mental Status: He is alert.   Psychiatric:         Mood and Affect: Mood normal.         Behavior: Behavior normal.     Extensive edema in the left LE secondary to fracture     Result Review :                     Assessment and Plan    Diagnoses and all " orders for this visit:    1. Class 3 severe obesity due to excess calories with serious comorbidity and body mass index (BMI) of 40.0 to 44.9 in adult (CMS/[facility]) (Primary)  Comments:  Disc diet and exercise  Orders:  -     Testosterone; Future  -     PSA DIAGNOSTIC; Future  -     Estrogens, Total; Future  -     CBC w AUTO Differential; Future  -     Comprehensive metabolic panel; Future    2. Hypogonadism, male  Overview:  Taking testosterone and arimidex for HRT.  No current issues    Orders:  -     Testosterone Cypionate (DEPOTESTOTERONE CYPIONATE) 200 MG/ML injection; Inject 1 mL into the appropriate muscle as directed by prescriber Every 14 (Fourteen) Days.  Dispense: 2 mL; Refill: 0  -     Testosterone; Future  -     PSA DIAGNOSTIC; Future  -     Estrogens, Total; Future  -     CBC w AUTO Differential; Future  -     Comprehensive metabolic panel; Future     Discussed possibly taking weekly injections.  He prefers every two weeks. Placed lab orders for 3 mos out.    3.  Elevated estrogen level -  He has rx for Arimidex     Follow Up   Return in about 3 months (around 9/18/2021).  Patient was given instructions and counseling regarding his condition or for health maintenance advice. Please see specific information pulled into the AVS if appropriate.     ROSA Julian

## 2021-07-16 DIAGNOSIS — E29.1 HYPOGONADISM, MALE: ICD-10-CM

## 2021-07-16 RX ORDER — CALCIUM CARBONATE 500(1250)
500 TABLET ORAL 2 TIMES DAILY
COMMUNITY
Start: 2021-05-03 | End: 2022-02-11

## 2021-07-16 RX ORDER — TESTOSTERONE CYPIONATE 200 MG/ML
200 INJECTION, SOLUTION INTRAMUSCULAR
Qty: 2 ML | Refills: 0 | Status: SHIPPED | OUTPATIENT
Start: 2021-07-16 | End: 2021-08-23 | Stop reason: SDUPTHER

## 2021-07-16 NOTE — TELEPHONE ENCOUNTER
Testosterone Cypionate 200mg/ml intramuscular oil. Inject 2ml every two weeks. #2 bottles no refills last filled on 6/18/2021.    LV: 6/7/21  NV: none  UDS:9/11/20  Labs: 6/7/2021

## 2021-08-23 DIAGNOSIS — E29.1 HYPOGONADISM, MALE: ICD-10-CM

## 2021-08-23 RX ORDER — TESTOSTERONE CYPIONATE 200 MG/ML
200 INJECTION, SOLUTION INTRAMUSCULAR
Qty: 2 ML | Refills: 0 | Status: SHIPPED | OUTPATIENT
Start: 2021-08-23 | End: 2021-09-21 | Stop reason: SDUPTHER

## 2021-09-10 ENCOUNTER — LAB (OUTPATIENT)
Dept: LAB | Facility: HOSPITAL | Age: 46
End: 2021-09-10

## 2021-09-10 DIAGNOSIS — E29.1 HYPOGONADISM, MALE: ICD-10-CM

## 2021-09-10 DIAGNOSIS — E66.01 CLASS 3 SEVERE OBESITY DUE TO EXCESS CALORIES WITH SERIOUS COMORBIDITY AND BODY MASS INDEX (BMI) OF 40.0 TO 44.9 IN ADULT (HCC): ICD-10-CM

## 2021-09-10 LAB
ALBUMIN SERPL-MCNC: 4.2 G/DL (ref 3.5–5.2)
ALBUMIN/GLOB SERPL: 1.1 G/DL
ALP SERPL-CCNC: 64 U/L (ref 39–117)
ALT SERPL W P-5'-P-CCNC: 35 U/L (ref 1–41)
ANION GAP SERPL CALCULATED.3IONS-SCNC: 8.4 MMOL/L (ref 5–15)
AST SERPL-CCNC: 46 U/L (ref 1–40)
BASOPHILS # BLD AUTO: 0.06 10*3/MM3 (ref 0–0.2)
BASOPHILS NFR BLD AUTO: 0.9 % (ref 0–1.5)
BILIRUB SERPL-MCNC: 0.5 MG/DL (ref 0–1.2)
BUN SERPL-MCNC: 18 MG/DL (ref 6–20)
BUN/CREAT SERPL: 12.2 (ref 7–25)
CALCIUM SPEC-SCNC: 9.3 MG/DL (ref 8.6–10.5)
CHLORIDE SERPL-SCNC: 103 MMOL/L (ref 98–107)
CO2 SERPL-SCNC: 25.6 MMOL/L (ref 22–29)
CREAT SERPL-MCNC: 1.47 MG/DL (ref 0.76–1.27)
DEPRECATED RDW RBC AUTO: 47.2 FL (ref 37–54)
EOSINOPHIL # BLD AUTO: 0.17 10*3/MM3 (ref 0–0.4)
EOSINOPHIL NFR BLD AUTO: 2.5 % (ref 0.3–6.2)
ERYTHROCYTE [DISTWIDTH] IN BLOOD BY AUTOMATED COUNT: 15.3 % (ref 12.3–15.4)
GFR SERPL CREATININE-BSD FRML MDRD: 51 ML/MIN/1.73
GLOBULIN UR ELPH-MCNC: 3.7 GM/DL
GLUCOSE SERPL-MCNC: 87 MG/DL (ref 65–99)
HCT VFR BLD AUTO: 51.8 % (ref 37.5–51)
HGB BLD-MCNC: 16.7 G/DL (ref 13–17.7)
IMM GRANULOCYTES # BLD AUTO: 0.03 10*3/MM3 (ref 0–0.05)
IMM GRANULOCYTES NFR BLD AUTO: 0.4 % (ref 0–0.5)
LYMPHOCYTES # BLD AUTO: 2.09 10*3/MM3 (ref 0.7–3.1)
LYMPHOCYTES NFR BLD AUTO: 30.3 % (ref 19.6–45.3)
MCH RBC QN AUTO: 27.8 PG (ref 26.6–33)
MCHC RBC AUTO-ENTMCNC: 32.2 G/DL (ref 31.5–35.7)
MCV RBC AUTO: 86.3 FL (ref 79–97)
MONOCYTES # BLD AUTO: 0.76 10*3/MM3 (ref 0.1–0.9)
MONOCYTES NFR BLD AUTO: 11 % (ref 5–12)
NEUTROPHILS NFR BLD AUTO: 3.79 10*3/MM3 (ref 1.7–7)
NEUTROPHILS NFR BLD AUTO: 54.9 % (ref 42.7–76)
NRBC BLD AUTO-RTO: 0 /100 WBC (ref 0–0.2)
PLATELET # BLD AUTO: 230 10*3/MM3 (ref 140–450)
PMV BLD AUTO: 10.1 FL (ref 6–12)
POTASSIUM SERPL-SCNC: 4.3 MMOL/L (ref 3.5–5.2)
PROT SERPL-MCNC: 7.9 G/DL (ref 6–8.5)
PSA SERPL-MCNC: 1.36 NG/ML (ref 0–4)
RBC # BLD AUTO: 6 10*6/MM3 (ref 4.14–5.8)
SODIUM SERPL-SCNC: 137 MMOL/L (ref 136–145)
TESTOST SERPL-MCNC: 455 NG/DL (ref 249–836)
WBC # BLD AUTO: 6.9 10*3/MM3 (ref 3.4–10.8)

## 2021-09-10 PROCEDURE — 36415 COLL VENOUS BLD VENIPUNCTURE: CPT

## 2021-09-10 PROCEDURE — 85025 COMPLETE CBC W/AUTO DIFF WBC: CPT

## 2021-09-10 PROCEDURE — 82672 ASSAY OF ESTROGEN: CPT

## 2021-09-10 PROCEDURE — 84403 ASSAY OF TOTAL TESTOSTERONE: CPT

## 2021-09-10 PROCEDURE — 80053 COMPREHEN METABOLIC PANEL: CPT

## 2021-09-10 PROCEDURE — 84153 ASSAY OF PSA TOTAL: CPT

## 2021-09-12 LAB — ESTROGEN SERPL-MCNC: 93 PG/ML (ref 56–213)

## 2021-09-21 DIAGNOSIS — E29.1 HYPOGONADISM, MALE: ICD-10-CM

## 2021-09-21 RX ORDER — TESTOSTERONE CYPIONATE 200 MG/ML
200 INJECTION, SOLUTION INTRAMUSCULAR
Qty: 2 ML | Refills: 0 | Status: SHIPPED | OUTPATIENT
Start: 2021-09-21 | End: 2021-10-25 | Stop reason: SDUPTHER

## 2021-10-22 ENCOUNTER — TELEPHONE (OUTPATIENT)
Dept: FAMILY MEDICINE CLINIC | Facility: CLINIC | Age: 46
End: 2021-10-22

## 2021-10-25 DIAGNOSIS — E29.1 HYPOGONADISM, MALE: ICD-10-CM

## 2021-10-25 RX ORDER — ANASTROZOLE 1 MG/1
1 TABLET ORAL DAILY
Qty: 10 TABLET | Refills: 1 | Status: SHIPPED | OUTPATIENT
Start: 2021-10-25 | End: 2022-02-11

## 2021-10-25 RX ORDER — TESTOSTERONE CYPIONATE 200 MG/ML
200 INJECTION, SOLUTION INTRAMUSCULAR
Qty: 2 ML | Refills: 0 | Status: SHIPPED | OUTPATIENT
Start: 2021-10-25 | End: 2022-02-11

## 2021-10-25 NOTE — TELEPHONE ENCOUNTER
Pt had labs done last month including testosterone, LM advising pt it is too early and insurance will not pay

## 2021-12-03 ENCOUNTER — CLINICAL SUPPORT (OUTPATIENT)
Dept: FAMILY MEDICINE CLINIC | Facility: CLINIC | Age: 46
End: 2021-12-03

## 2021-12-03 DIAGNOSIS — Z23 FLU VACCINE NEED: Primary | ICD-10-CM

## 2021-12-03 PROCEDURE — 90471 IMMUNIZATION ADMIN: CPT | Performed by: PHYSICIAN ASSISTANT

## 2021-12-03 PROCEDURE — 90686 IIV4 VACC NO PRSV 0.5 ML IM: CPT | Performed by: PHYSICIAN ASSISTANT

## 2022-02-11 ENCOUNTER — OFFICE VISIT (OUTPATIENT)
Dept: FAMILY MEDICINE CLINIC | Facility: CLINIC | Age: 47
End: 2022-02-11

## 2022-02-11 VITALS
DIASTOLIC BLOOD PRESSURE: 73 MMHG | HEIGHT: 69 IN | WEIGHT: 275 LBS | BODY MASS INDEX: 40.73 KG/M2 | SYSTOLIC BLOOD PRESSURE: 119 MMHG | HEART RATE: 78 BPM | OXYGEN SATURATION: 97 %

## 2022-02-11 DIAGNOSIS — E29.1 HYPOGONADISM, MALE: Primary | ICD-10-CM

## 2022-02-11 DIAGNOSIS — E28.0 ESTROGEN EXCESS: ICD-10-CM

## 2022-02-11 DIAGNOSIS — E66.01 CLASS 3 SEVERE OBESITY DUE TO EXCESS CALORIES WITH SERIOUS COMORBIDITY AND BODY MASS INDEX (BMI) OF 40.0 TO 44.9 IN ADULT: Chronic | ICD-10-CM

## 2022-02-11 PROCEDURE — 99213 OFFICE O/P EST LOW 20 MIN: CPT | Performed by: PHYSICIAN ASSISTANT

## 2022-02-11 NOTE — PROGRESS NOTES
"Chief Complaint  Hypogonadism (follow up)    Subjective          Hardik Woo presents to [facility]  History of Present Illness  Pt presents today for follow up on testosterone.    Pt states he is finished taking testosterone. Pt states since [employer] has taken over it is a nightmare to talk to anyone for refills or appt.  Pt states he is still fighting w/[employer] on last lab bill.    Labs 9/10/21  PSA 1.36  Test 455    No CP, SOA, HA    Answers for HPI/ROS submitted by the patient on 2/8/2022  What is the primary reason for your visit?: Other  Please describe your symptoms.: Follow up  Have you had these symptoms before?: No  How long have you been having these symptoms?: 1-4 days  Please list any medications you are currently taking for this condition.: None  Please describe any probable cause for these symptoms. : None    Past Medical History:   Diagnosis Date   • Hypogonadism male 12/22/2019   • No pertinent past medical history    • Screening for colon cancer 2014      Family History   Problem Relation Age of Onset   • Heart disease Mother    • Diabetes Mother    • Heart disease Father    • Heart disease Maternal Grandfather    • Other Other         liz neoplasn, malignant   • Other Other         no family history of colorectal cancer      Past Surgical History:   Procedure Laterality Date   • COLONOSCOPY  2011,2020      No current outpatient medications on file.    Objective     Vital Signs:     /73 (BP Location: Right arm)   Pulse 78   Ht 175.3 cm (69\")   Wt 125 kg (275 lb)   SpO2 97%   BMI 40.61 kg/m²    Estimated body mass index is 40.61 kg/m² as calculated from the following:    Height as of this encounter: 175.3 cm (69\").    Weight as of this encounter: 125 kg (275 lb).     Wt Readings from Last 3 Encounters:   02/11/22 125 kg (275 lb)   06/18/21 123 kg (271 lb 6.4 oz)   12/15/20 123 kg (272 lb 2 oz)     BP Readings from Last 3 Encounters:   02/11/22 119/73 "   06/18/21 133/76   12/15/20 136/95     Physical Exam  Vitals and nursing note reviewed.   Constitutional:       Appearance: Normal appearance. He is obese.   HENT:      Head: Normocephalic and atraumatic.   Cardiovascular:      Rate and Rhythm: Normal rate and regular rhythm.   Pulmonary:      Effort: Pulmonary effort is normal.      Breath sounds: Normal breath sounds.   Musculoskeletal:      Cervical back: Neck supple.   Neurological:      Mental Status: He is alert.   Psychiatric:         Mood and Affect: Mood normal.         Behavior: Behavior normal.        Result Review :     Common labs    Common Labsle 5/28/21 5/28/21 5/28/21 6/7/21 6/7/21 6/7/21 9/10/21 9/10/21 9/10/21    1109 1109 1109 0707 0707 0707 1001 1001 1001   Glucose   93  90   87    BUN   9  12   18    Creatinine   1.03  1.12   1.47 (A)    eGFR Non  Am     70   51 (A)    Sodium   137  138   137    Potassium   3.8  4.3   4.3    Chloride   103  105   103    Calcium   9.1  8.9   9.3    Albumin   3.8  3.80   4.20    Total Bilirubin   0.44  0.5   0.5    Alkaline Phosphatase   75  67   64    AST (SGOT)   22  16   46 (A)    ALT (SGPT)   20  15   35    WBC 7.39   6.44   6.90     Hemoglobin 15.9   16.3   16.7     Hematocrit 48.7   49.3   51.8 (A)     Platelets 248   262   230     PSA  1.52    1.330   1.360   (A) Abnormal value       Comments are available for some flowsheets but are not being displayed.                         Assessment and Plan      Diagnoses and all orders for this visit:    1. Hypogonadism, male (Primary)  Overview:  Pt stopped his testosterone and arimidex for HRT.  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 Class 3 severe obesity due to excess calories with serious comorbidity and body mass index (BMI) of 40.0 to 44.9 in adult (HCC)  Comments:  Disc diet and exercise    3. Estrogen excess  Comments:  Secondary to Testosterone replacement therapy - Arimidex 1mg      Pt will have yearly CPE on his next ov  He will  msg me if he changes his mind regarding testosterone therapy    Follow Up     Return in about 1 year (around 2/11/2023).    Patient was given instructions and counseling regarding his condition or for health maintenance advice. Please see specific information pulled into the AVS if appropriate.     I have reviewed information obtained and documented by others and I have confirmed the accuracy of this documented note.    ROSA Julian

## 2023-03-10 ENCOUNTER — LAB (OUTPATIENT)
Dept: LAB | Facility: HOSPITAL | Age: 48
End: 2023-03-10
Payer: COMMERCIAL

## 2023-03-10 ENCOUNTER — OFFICE VISIT (OUTPATIENT)
Dept: FAMILY MEDICINE CLINIC | Facility: CLINIC | Age: 48
End: 2023-03-10
Payer: COMMERCIAL

## 2023-03-10 VITALS
WEIGHT: 280.6 LBS | OXYGEN SATURATION: 97 % | DIASTOLIC BLOOD PRESSURE: 68 MMHG | SYSTOLIC BLOOD PRESSURE: 121 MMHG | HEIGHT: 69 IN | BODY MASS INDEX: 41.56 KG/M2 | HEART RATE: 69 BPM

## 2023-03-10 DIAGNOSIS — Z12.5 ENCOUNTER FOR PROSTATE CANCER SCREENING: ICD-10-CM

## 2023-03-10 DIAGNOSIS — Z01.89 ROUTINE LAB DRAW: ICD-10-CM

## 2023-03-10 DIAGNOSIS — Z13.6 ENCOUNTER FOR LIPID SCREENING FOR CARDIOVASCULAR DISEASE: ICD-10-CM

## 2023-03-10 DIAGNOSIS — Z11.59 ENCOUNTER FOR HEPATITIS C SCREENING TEST FOR LOW RISK PATIENT: ICD-10-CM

## 2023-03-10 DIAGNOSIS — E55.9 VITAMIN D DEFICIENCY: ICD-10-CM

## 2023-03-10 DIAGNOSIS — Z13.29 SCREENING FOR THYROID DISORDER: ICD-10-CM

## 2023-03-10 DIAGNOSIS — E66.01 CLASS 3 SEVERE OBESITY DUE TO EXCESS CALORIES WITH SERIOUS COMORBIDITY AND BODY MASS INDEX (BMI) OF 40.0 TO 44.9 IN ADULT: ICD-10-CM

## 2023-03-10 DIAGNOSIS — R60.0 EDEMA OF LEFT LOWER EXTREMITY: Primary | Chronic | ICD-10-CM

## 2023-03-10 DIAGNOSIS — Z13.220 ENCOUNTER FOR LIPID SCREENING FOR CARDIOVASCULAR DISEASE: ICD-10-CM

## 2023-03-10 DIAGNOSIS — Z79.899 LONG TERM USE OF DRUG: ICD-10-CM

## 2023-03-10 LAB
25(OH)D3 SERPL-MCNC: 34.2 NG/ML (ref 30–100)
ALBUMIN SERPL-MCNC: 3.9 G/DL (ref 3.5–5.2)
ALBUMIN/GLOB SERPL: 1.1 G/DL
ALP SERPL-CCNC: 73 U/L (ref 39–117)
ALT SERPL W P-5'-P-CCNC: 54 U/L (ref 1–41)
AMPHET+METHAMPHET UR QL: NEGATIVE
AMPHETAMINE INTERNAL CONTROL: NORMAL
AMPHETAMINES UR QL: NEGATIVE
ANION GAP SERPL CALCULATED.3IONS-SCNC: 7 MMOL/L (ref 5–15)
AST SERPL-CCNC: 41 U/L (ref 1–40)
BARBITURATE INTERNAL CONTROL: NORMAL
BARBITURATES UR QL SCN: NEGATIVE
BASOPHILS # BLD AUTO: 0.05 10*3/MM3 (ref 0–0.2)
BASOPHILS NFR BLD AUTO: 0.7 % (ref 0–1.5)
BENZODIAZ UR QL SCN: NEGATIVE
BENZODIAZEPINE INTERNAL CONTROL: NORMAL
BILIRUB SERPL-MCNC: 0.3 MG/DL (ref 0–1.2)
BUN SERPL-MCNC: 14 MG/DL (ref 6–20)
BUN/CREAT SERPL: 14 (ref 7–25)
BUPRENORPHINE INTERNAL CONTROL: NORMAL
BUPRENORPHINE SERPL-MCNC: NEGATIVE NG/ML
CALCIUM SPEC-SCNC: 9.2 MG/DL (ref 8.6–10.5)
CANNABINOIDS SERPL QL: NEGATIVE
CHLORIDE SERPL-SCNC: 105 MMOL/L (ref 98–107)
CHOLEST SERPL-MCNC: 156 MG/DL (ref 0–200)
CO2 SERPL-SCNC: 26 MMOL/L (ref 22–29)
COCAINE INTERNAL CONTROL: NORMAL
COCAINE UR QL: NEGATIVE
CREAT SERPL-MCNC: 1 MG/DL (ref 0.76–1.27)
DEPRECATED RDW RBC AUTO: 41.5 FL (ref 37–54)
EGFRCR SERPLBLD CKD-EPI 2021: 92.3 ML/MIN/1.73
EOSINOPHIL # BLD AUTO: 0.24 10*3/MM3 (ref 0–0.4)
EOSINOPHIL NFR BLD AUTO: 3.4 % (ref 0.3–6.2)
ERYTHROCYTE [DISTWIDTH] IN BLOOD BY AUTOMATED COUNT: 12.9 % (ref 12.3–15.4)
EXPIRATION DATE: NORMAL
GLOBULIN UR ELPH-MCNC: 3.7 GM/DL
GLUCOSE SERPL-MCNC: 100 MG/DL (ref 65–99)
HCT VFR BLD AUTO: 44.5 % (ref 37.5–51)
HCV AB SER DONR QL: NORMAL
HDLC SERPL-MCNC: 43 MG/DL (ref 40–60)
HGB BLD-MCNC: 15.3 G/DL (ref 13–17.7)
IMM GRANULOCYTES # BLD AUTO: 0.03 10*3/MM3 (ref 0–0.05)
IMM GRANULOCYTES NFR BLD AUTO: 0.4 % (ref 0–0.5)
LDLC SERPL CALC-MCNC: 88 MG/DL (ref 0–100)
LDLC/HDLC SERPL: 1.96 {RATIO}
LYMPHOCYTES # BLD AUTO: 2.51 10*3/MM3 (ref 0.7–3.1)
LYMPHOCYTES NFR BLD AUTO: 35.7 % (ref 19.6–45.3)
Lab: NORMAL
MCH RBC QN AUTO: 29.7 PG (ref 26.6–33)
MCHC RBC AUTO-ENTMCNC: 34.4 G/DL (ref 31.5–35.7)
MCV RBC AUTO: 86.2 FL (ref 79–97)
MDMA (ECSTASY) INTERNAL CONTROL: NORMAL
MDMA UR QL SCN: NEGATIVE
METHADONE INTERNAL CONTROL: NORMAL
METHADONE UR QL SCN: NEGATIVE
METHAMPHETAMINE INTERNAL CONTROL: NORMAL
MONOCYTES # BLD AUTO: 0.64 10*3/MM3 (ref 0.1–0.9)
MONOCYTES NFR BLD AUTO: 9.1 % (ref 5–12)
NEUTROPHILS NFR BLD AUTO: 3.57 10*3/MM3 (ref 1.7–7)
NEUTROPHILS NFR BLD AUTO: 50.7 % (ref 42.7–76)
NRBC BLD AUTO-RTO: 0 /100 WBC (ref 0–0.2)
OPIATES INTERNAL CONTROL: NORMAL
OPIATES UR QL: NEGATIVE
OXYCODONE INTERNAL CONTROL: NORMAL
OXYCODONE UR QL SCN: NEGATIVE
PCP UR QL SCN: NEGATIVE
PHENCYCLIDINE INTERNAL CONTROL: NORMAL
PLATELET # BLD AUTO: 232 10*3/MM3 (ref 140–450)
PMV BLD AUTO: 10.4 FL (ref 6–12)
POTASSIUM SERPL-SCNC: 4.4 MMOL/L (ref 3.5–5.2)
PROT SERPL-MCNC: 7.6 G/DL (ref 6–8.5)
PSA SERPL-MCNC: 1.04 NG/ML (ref 0–4)
RBC # BLD AUTO: 5.16 10*6/MM3 (ref 4.14–5.8)
SODIUM SERPL-SCNC: 138 MMOL/L (ref 136–145)
THC INTERNAL CONTROL: NORMAL
TRIGL SERPL-MCNC: 143 MG/DL (ref 0–150)
TSH SERPL DL<=0.05 MIU/L-ACNC: 2.94 UIU/ML (ref 0.27–4.2)
VLDLC SERPL-MCNC: 25 MG/DL (ref 5–40)
WBC NRBC COR # BLD: 7.04 10*3/MM3 (ref 3.4–10.8)

## 2023-03-10 PROCEDURE — 80050 GENERAL HEALTH PANEL: CPT

## 2023-03-10 PROCEDURE — 36415 COLL VENOUS BLD VENIPUNCTURE: CPT

## 2023-03-10 PROCEDURE — 82306 VITAMIN D 25 HYDROXY: CPT

## 2023-03-10 PROCEDURE — 99204 OFFICE O/P NEW MOD 45 MIN: CPT

## 2023-03-10 PROCEDURE — 80305 DRUG TEST PRSMV DIR OPT OBS: CPT

## 2023-03-10 PROCEDURE — G0103 PSA SCREENING: HCPCS

## 2023-03-10 PROCEDURE — 86803 HEPATITIS C AB TEST: CPT

## 2023-03-10 PROCEDURE — 80061 LIPID PANEL: CPT

## 2023-03-10 RX ORDER — MULTIPLE VITAMINS W/ MINERALS TAB 9MG-400MCG
1 TAB ORAL DAILY
COMMUNITY

## 2023-03-10 RX ORDER — CHLORAL HYDRATE 500 MG
1000 CAPSULE ORAL
COMMUNITY

## 2023-03-10 NOTE — PROGRESS NOTES
Chief Complaint  Foot Injury (Wants right feet checked, had SX 2 years ago) and Weight Loss    SUBJECTIVE  Hardik Woo presents to [facility]    History of Present Illness  47-year-old Hardik Woo presents today to establish care.    Patient states that he had surgery on his left foot and has been having discoloration of his left calf since that time.  He states that he was told to wear compression stockings.  Patient does state that he is unable to keep them on for longer than a couple minutes as it is increasing pain level for him.  He states that when he is not using compression stockings that he he then has no pain at that site.  Patient states that swelling will increase when he is taking long trips.    Patient does want to discuss weight loss on today's visit.  States that he has been trying to eat a well-balanced diet however he is not seeing any improvement in weight.  UDS and consent were done in office today.  We will send information to Dr. Hobson for review.    Past Medical History:   Diagnosis Date   • Hypogonadism male 12/22/2019   • No pertinent past medical history    • Screening for colon cancer 2014      Family History   Problem Relation Age of Onset   • Heart disease Mother    • Diabetes Mother    • Heart disease Father    • Heart disease Maternal Grandfather    • Other Other         liz neoplasn, malignant   • Other Other         no family history of colorectal cancer      Past Surgical History:   Procedure Laterality Date   • COLONOSCOPY  2011,2020        Current Outpatient Medications:   •  multivitamin with minerals tablet tablet, Take 1 tablet by mouth Daily., Disp: , Rfl:   •  Omega-3 Fatty Acids (fish oil) 1000 MG capsule capsule, Take 1 capsule by mouth Daily With Breakfast., Disp: , Rfl:   •  phentermine 15 MG capsule, Take 1 capsule by mouth Every Morning., Disp: 30 capsule, Rfl: 0  •  vitamin D3 (Vitamin D) 125 MCG (5000 UT) capsule capsule,  "Take 1 capsule by mouth Daily., Disp: , Rfl:     OBJECTIVE  Vital Signs:   /68 (BP Location: Left arm)   Pulse 69   Ht 175.3 cm (69\")   Wt 127 kg (280 lb 9.6 oz)   SpO2 97%   BMI 41.44 kg/m²    Estimated body mass index is 41.44 kg/m² as calculated from the following:    Height as of this encounter: 175.3 cm (69\").    Weight as of this encounter: 127 kg (280 lb 9.6 oz).     Wt Readings from Last 3 Encounters:   03/10/23 127 kg (280 lb 9.6 oz)   02/11/22 125 kg (275 lb)   06/18/21 123 kg (271 lb 6.4 oz)     BP Readings from Last 3 Encounters:   03/10/23 121/68   02/11/22 119/73   06/18/21 133/76       Physical Exam  Vitals and nursing note reviewed.   Constitutional:       Appearance: Normal appearance. He is obese.   HENT:      Head: Normocephalic and atraumatic.      Nose: Nose normal.      Mouth/Throat:      Mouth: Mucous membranes are moist.   Eyes:      Conjunctiva/sclera: Conjunctivae normal.      Pupils: Pupils are equal, round, and reactive to light.   Cardiovascular:      Rate and Rhythm: Normal rate and regular rhythm.      Pulses: Normal pulses.      Heart sounds: Normal heart sounds.   Pulmonary:      Effort: Pulmonary effort is normal.      Breath sounds: Normal breath sounds.   Abdominal:      General: Abdomen is flat. Bowel sounds are normal.      Palpations: Abdomen is soft.   Musculoskeletal:         General: Normal range of motion.      Cervical back: Normal range of motion and neck supple.   Skin:     General: Skin is warm and dry.      Capillary Refill: Capillary refill takes less than 2 seconds.      Comments: There is some discoloration of the left calf area.  Discussed with patient to start wearing compression stockings to help with blood flow.   Neurological:      General: No focal deficit present.      Mental Status: He is alert and oriented to person, place, and time. Mental status is at baseline.   Psychiatric:         Mood and Affect: Mood normal.         Behavior: Behavior " normal.         Thought Content: Thought content normal.         Judgment: Judgment normal.          Result Review        No Images in the past 120 days found..      The above data has been reviewed by TAMY Najera 03/10/2023 10:01 EST.          Patient Care Team:  Staci Granado APRN as PCP - General (Emergency Medicine)           ASSESSMENT & PLAN    Diagnoses and all orders for this visit:    1. Edema of left lower extremity (Primary)  Comments:  Have recommended that patient start using compression stockings.  Specially on long road trips.    2. Class 3 severe obesity due to excess calories with serious comorbidity and body mass index (BMI) of 40.0 to 44.9 in adult (HCC)  Comments:  Have referred patient to Dr. Hobson for phentermine.  He will return for follow-up in 1 month.    3. Vitamin D deficiency  Comments:  We will check with labs.  Orders:  -     Vitamin D 25 hydroxy; Future    4. Screening for thyroid disorder  -     TSH; Future    5. Encounter for lipid screening for cardiovascular disease  -     Lipid Panel; Future    6. Routine lab draw  -     Comprehensive Metabolic Panel; Future  -     CBC & Differential; Future    7. Encounter for hepatitis C screening test for low risk patient  -     Hepatitis C antibody; Future    8. Encounter for prostate cancer screening  -     PSA SCREENING; Future    9. Long term use of drug  -     POC Urine Drug Screen [facility]         Tobacco Use: Low Risk    • Smoking Tobacco Use: Never   • Smokeless Tobacco Use: Never   • Passive Exposure: Not on file       Follow Up     Return in about 4 weeks (around 4/7/2023) for Recheck phentermine.      Patient was given instructions and counseling regarding his condition or for health maintenance advice. Please see specific information pulled into the AVS if appropriate.   I have reviewed information obtained and documented by others and I have confirmed the accuracy of this documented note.    Staci Granado,  APRN

## 2023-03-10 NOTE — PROGRESS NOTES
Chief Complaint  Foot Injury (Wants right foot checked, had SX 2 years ago), Weight Loss, and Annual Exam    SUBJECTIVE  Hardik Woo presents to [facility]    History of Present Illness  47-year-old Hardik Woo presents today to establish care.  Patient was a previous patient of Armani Johnson.     Patient presents today for management of weight loss.  States he is lost 13 pounds since January however is at a standstill with weight loss.  Patient states that he can only use treadmill and go low intensity as his knee and leg have been hurting since his foot injury.        States that he still has some discoloration of lower ankle and calf area that he is concerned about.  States he has not been wearing his compression stockings as recommended by his physician as he is has a hard time getting them on.  Patient states that he travels a lot for work and does eat out frequently.  He drinks alcohol socially and does not smoke.  Patient does take multivitamin, CQ 10, fish oil.    Patient will have UDS and consent done in office today.    Past Medical History:   Diagnosis Date   • Hypogonadism male 12/22/2019   • No pertinent past medical history    • Screening for colon cancer 2014      Family History   Problem Relation Age of Onset   • Heart disease Mother    • Diabetes Mother    • Heart disease Father    • Heart disease Maternal Grandfather    • Other Other         liz neoplasn, malignant   • Other Other         no family history of colorectal cancer      Past Surgical History:   Procedure Laterality Date   • COLONOSCOPY  2011,2020        Current Outpatient Medications:   •  multivitamin with minerals tablet tablet, Take 1 tablet by mouth Daily., Disp: , Rfl:   •  Omega-3 Fatty Acids (fish oil) 1000 MG capsule capsule, Take 1 capsule by mouth Daily With Breakfast., Disp: , Rfl:   •  vitamin D3 (Vitamin D) 125 MCG (5000 UT) capsule capsule, Take 1 capsule by mouth Daily., Disp: , Rfl:  "    OBJECTIVE  Vital Signs:   /68 (BP Location: Left arm)   Pulse 69   Ht 175.3 cm (69\")   Wt 127 kg (280 lb 9.6 oz)   SpO2 97%   BMI 41.44 kg/m²    Estimated body mass index is 41.44 kg/m² as calculated from the following:    Height as of this encounter: 175.3 cm (69\").    Weight as of this encounter: 127 kg (280 lb 9.6 oz).     Wt Readings from Last 3 Encounters:   03/10/23 127 kg (280 lb 9.6 oz)   02/11/22 125 kg (275 lb)   06/18/21 123 kg (271 lb 6.4 oz)     BP Readings from Last 3 Encounters:   03/10/23 121/68   02/11/22 119/73   06/18/21 133/76       Physical Exam  Vitals and nursing note reviewed.   Constitutional:       Appearance: Normal appearance.   HENT:      Head: Normocephalic and atraumatic.   Eyes:      Conjunctiva/sclera: Conjunctivae normal.      Pupils: Pupils are equal, round, and reactive to light.   Cardiovascular:      Rate and Rhythm: Normal rate and regular rhythm.      Pulses: Normal pulses.      Heart sounds: Normal heart sounds.   Pulmonary:      Effort: Pulmonary effort is normal.      Breath sounds: Normal breath sounds.   Abdominal:      General: Abdomen is flat.      Palpations: Abdomen is soft.   Musculoskeletal:         General: Normal range of motion.      Cervical back: Normal range of motion and neck supple.      Left lower leg: Edema present.   Skin:     General: Skin is warm and dry.      Comments: Patient does have some discoloration of medial ankle area.  There is also swelling of the lower extremity.  Discussed with patient that compression stockings could help with some of the circulatory issues especially when he is traveling long distances which put him at increased risk for DVTs.   Neurological:      General: No focal deficit present.      Mental Status: He is alert and oriented to person, place, and time. Mental status is at baseline.   Psychiatric:         Mood and Affect: Mood normal.         Behavior: Behavior normal.         Thought Content: Thought " content normal.         Judgment: Judgment normal.          Result Review         Lab Results   Component Value Date    TESTOSTERONE 455.00 09/10/2021             No Images in the past 120 days found..      The above data has been reviewed by TAMY Najera 03/10/2023 09:50 EST.          Patient Care Team:  Staci Grnaado APRN as PCP - General (Emergency Medicine)    Class 3 Severe Obesity (BMI >=40). Obesity-related health conditions include the following: lower extremity venous stasis disease. Obesity is unchanged. BMI is is above average; BMI management plan is completed. We discussed portion control, increasing exercise and pharmacologic options including phentermin.       ASSESSMENT & PLAN    Diagnoses and all orders for this visit:    1. Screening for thyroid disorder (Primary)  -     TSH; Future    2. Encounter for lipid screening for cardiovascular disease  -     Lipid Panel; Future    3. Routine lab draw  -     Comprehensive Metabolic Panel; Future  -     CBC & Differential; Future    4. Vitamin D deficiency  Comments:  We will check with labs.  Orders:  -     Vitamin D 25 hydroxy; Future    5. Encounter for hepatitis C screening test for low risk patient  -     Hepatitis C antibody; Future    6. Class 3 severe obesity due to excess calories with serious comorbidity and body mass index (BMI) of 40.0 to 44.9 in adult (HCC)  Comments:  Have referred patient to Dr. Hobson for phentermine.  He will return for follow-up in 1 month.    7. Edema of left lower extremity  Comments:  Have recommended that patient start using compression stockings.  Specially on long road trips.    8. Encounter for prostate cancer screening  -     PSA SCREENING; Future         Tobacco Use: Low Risk    • Smoking Tobacco Use: Never   • Smokeless Tobacco Use: Never   • Passive Exposure: Not on file       Follow Up     Return in about 4 weeks (around 4/7/2023) for Recheck phentermine.      Patient was given instructions and  "counseling regarding his condition or for health maintenance advice. Please see specific information pulled into the AVS if appropriate.   I have reviewed information obtained and documented by others and I have confirmed the accuracy of this documented note.    TAMY Najera   Hardik Woo is a 47 y.o. male and is here for a comprehensive physical exam. The patient reports problems - lower left extremity edema.    Do you take any herbs or supplements that were not prescribed by a doctor? yes  Are you taking calcium supplements? yes  Are you taking aspirin daily? no     History:  Patient receives prostate care outside our clinic  Date last PSA: 9/10/2021    The following portions of the patient's history were reviewed and updated as appropriate: allergies, current medications, past family history, past medical history, past social history, past surgical history and problem list.    Review of Systems  Do you have pain that bothers you in your daily life? yes  A comprehensive review of systems was negative except for: Cardiovascular: positive for lower extremity edema    Objective   /68 (BP Location: Left arm)   Pulse 69   Ht 175.3 cm (69\")   Wt 127 kg (280 lb 9.6 oz)   SpO2 97%   BMI 41.44 kg/m²     General Appearance:    Alert, cooperative, no distress, appears stated age   Head:    Normocephalic, without obvious abnormality, atraumatic   Eyes:    PERRL, conjunctiva/corneas clear, EOM's intact, fundi     benign, both eyes        Ears:    Normal TM's and external ear canals, both ears   Nose:   Nares normal, septum midline, mucosa normal, no drainage    or sinus tenderness   Throat:   Lips, mucosa, and tongue normal; teeth and gums normal   Neck:   Supple, symmetrical, trachea midline, no adenopathy;        thyroid:  No enlargement/tenderness/nodules; no carotid    bruit or JVD   Back:     Symmetric, no curvature, ROM normal, no CVA tenderness   Lungs:     Clear to auscultation " bilaterally, respirations unlabored   Chest wall:    No tenderness or deformity   Heart:    Regular rate and rhythm, S1 and S2 normal, no murmur, rub   or gallop   Abdomen:     Soft, non-tender, bowel sounds active all four quadrants,     no masses, no organomegaly   Genitalia:    Normal male without lesion, discharge or tenderness   Rectal:    Normal tone, normal prostate, no masses or tenderness;    guaiac negative stool   Extremities:   Extremities normal, atraumatic, lower left extremity edema   Pulses:   2+ and symmetric all extremities   Skin:   Skin color, texture, turgor normal, no rashes or lesions   Lymph nodes:   Cervical, supraclavicular, and axillary nodes normal   Neurologic:   CNII-XII intact. Normal strength, sensation and reflexes       throughout           Past Surgical History:   Procedure Laterality Date   • COLONOSCOPY  2011,2020      Family History   Problem Relation Age of Onset   • Heart disease Mother    • Diabetes Mother    • Heart disease Father    • Heart disease Maternal Grandfather    • Other Other         liz neoplasn, malignant   • Other Other         no family history of colorectal cancer        Current Outpatient Medications:   •  multivitamin with minerals tablet tablet, Take 1 tablet by mouth Daily., Disp: , Rfl:   •  Omega-3 Fatty Acids (fish oil) 1000 MG capsule capsule, Take 1 capsule by mouth Daily With Breakfast., Disp: , Rfl:   •  vitamin D3 (Vitamin D) 125 MCG (5000 UT) capsule capsule, Take 1 capsule by mouth Daily., Disp: , Rfl:    Assessment and Plan  Diagnoses and all orders for this visit:    1. Screening for thyroid disorder (Primary)  -     TSH; Future    2. Encounter for lipid screening for cardiovascular disease  -     Lipid Panel; Future    3. Routine lab draw  -     Comprehensive Metabolic Panel; Future  -     CBC & Differential; Future    4. Vitamin D deficiency  Comments:  We will check with labs.  Orders:  -     Vitamin D 25 hydroxy; Future    5. Encounter  for hepatitis C screening test for low risk patient  -     Hepatitis C antibody; Future    6. Class 3 severe obesity due to excess calories with serious comorbidity and body mass index (BMI) of 40.0 to 44.9 in adult (HCC)  Comments:  Have referred patient to Dr. Hobson for phentermine.  He will return for follow-up in 1 month.    7. Edema of left lower extremity  Comments:  Have recommended that patient start using compression stockings.  Specially on long road trips.    8. Encounter for prostate cancer screening  -     PSA SCREENING; Future      Healthy male exam.   Screening for thyroid disorder [Z13.29]     1.   2. Patient Counseling:  --Nutrition: Stressed importance of moderation in sodium/caffeine intake, saturated fat and cholesterol, caloric balance, sufficient intake of fresh fruits, vegetables, fiber, calcium, iron, and 1 mg of folate supplement per day  --Discussed the issue of estrogen replacement, calcium supplement, and the daily use of baby aspirin.  --Exercise: Stressed the importance of regular exercise.   --Substance Abuse: Discussed cessation/primary prevention of tobacco, alcohol, or other drug use; driving or other dangerous activities under the influence; availability of treatment for abuse.    --Sexuality: Discussed sexually transmitted diseases, partner selection, use of condoms, avoidance of unintended pregnancy  and contraceptive alternatives.   --Injury prevention: Discussed safety belts, safety helmets, smoke detector, smoking near bedding or upholstery.   --Dental health: Discussed importance of regular tooth brushing, flossing, and dental visits.  --Immunizations reviewed.        3. Discussed the patient's BMI with him.  The BMI is above average; BMI management plan is completed  4. Follow up in 4 weeks      The patient is advised to attempt to lose weight, reduce salt in diet and cooking, reduce exposure to stress, continue current medications and return for routine annual checkups.

## 2023-03-13 DIAGNOSIS — R73.09 ELEVATED RANDOM BLOOD GLUCOSE LEVEL: ICD-10-CM

## 2023-03-13 DIAGNOSIS — R74.8 ELEVATED LIVER ENZYMES: Primary | ICD-10-CM

## 2023-03-13 RX ORDER — PHENTERMINE HYDROCHLORIDE 15 MG/1
15 CAPSULE ORAL EVERY MORNING
Qty: 30 CAPSULE | Refills: 0 | Status: SHIPPED | OUTPATIENT
Start: 2023-03-13

## 2023-04-10 ENCOUNTER — OFFICE VISIT (OUTPATIENT)
Dept: FAMILY MEDICINE CLINIC | Facility: CLINIC | Age: 48
End: 2023-04-10
Payer: COMMERCIAL

## 2023-04-10 VITALS
OXYGEN SATURATION: 96 % | SYSTOLIC BLOOD PRESSURE: 114 MMHG | DIASTOLIC BLOOD PRESSURE: 69 MMHG | BODY MASS INDEX: 41.23 KG/M2 | HEART RATE: 77 BPM | HEIGHT: 69 IN | WEIGHT: 278.4 LBS

## 2023-04-10 DIAGNOSIS — Z76.89 ENCOUNTER FOR WEIGHT MANAGEMENT: Primary | ICD-10-CM

## 2023-04-10 DIAGNOSIS — L60.0 INGROWN LEFT BIG TOENAIL: ICD-10-CM

## 2023-04-10 RX ORDER — PHENTERMINE HYDROCHLORIDE 30 MG/1
30 CAPSULE ORAL EVERY MORNING
Qty: 30 CAPSULE | Refills: 0 | Status: SHIPPED | OUTPATIENT
Start: 2023-04-10 | End: 2023-05-10

## 2023-04-10 RX ORDER — SULFAMETHOXAZOLE AND TRIMETHOPRIM 800; 160 MG/1; MG/1
1 TABLET ORAL 2 TIMES DAILY
Qty: 10 TABLET | Refills: 0 | Status: SHIPPED | OUTPATIENT
Start: 2023-04-10 | End: 2023-04-15

## 2023-04-10 NOTE — PROGRESS NOTES
"Chief Complaint  Weight Check (Last OV 280lb  today 278.4lbs ) and Toe Pain (Left great toe )    SUBJECTIVE  Hardik Woo presents to [facility]    History of Present Illness  47-year-old Hardik Woo presents today for 1 month follow-up on initiation of phentermine 15 mg tablets daily.  Patient states that he is doing well and has lost 2 pounds but has been staying stable since the initial 2 pound weight loss.    Patient also states that he has had pain on the left great toe was trying to remove ingrown toenail however was unsuccessful.  Patient does still have A1c, hepatic panel, CMP still active for blood draw.  States he has a family history of diabetes.    Past Medical History:   Diagnosis Date   • Hypogonadism male 12/22/2019   • No pertinent past medical history    • Screening for colon cancer 2014      Family History   Problem Relation Age of Onset   • Heart disease Mother    • Diabetes Mother    • Heart disease Father    • Heart disease Maternal Grandfather    • Other Other         liz neoplasn, malignant   • Other Other         no family history of colorectal cancer      Past Surgical History:   Procedure Laterality Date   • COLONOSCOPY  2011,2020        Current Outpatient Medications:   •  multivitamin with minerals tablet tablet, Take 1 tablet by mouth Daily., Disp: , Rfl:   •  Omega-3 Fatty Acids (fish oil) 1000 MG capsule capsule, Take 1 capsule by mouth Daily With Breakfast., Disp: , Rfl:   •  phentermine 15 MG capsule, Take 1 capsule by mouth Every Morning., Disp: 30 capsule, Rfl: 0  •  vitamin D3 125 MCG (5000 UT) capsule capsule, Take 1 capsule by mouth Daily., Disp: , Rfl:   •  sulfamethoxazole-trimethoprim (BACTRIM DS,SEPTRA DS) 800-160 MG per tablet, Take 1 tablet by mouth 2 (Two) Times a Day for 5 days., Disp: 10 tablet, Rfl: 0    OBJECTIVE  Vital Signs:   /69 (BP Location: Left arm)   Pulse 77   Ht 175.3 cm (69\")   Wt 126 kg (278 lb 6.4 oz)   " "SpO2 96%   BMI 41.11 kg/m²    Estimated body mass index is 41.11 kg/m² as calculated from the following:    Height as of this encounter: 175.3 cm (69\").    Weight as of this encounter: 126 kg (278 lb 6.4 oz).     Wt Readings from Last 3 Encounters:   04/10/23 126 kg (278 lb 6.4 oz)   03/10/23 127 kg (280 lb 9.6 oz)   02/11/22 125 kg (275 lb)     BP Readings from Last 3 Encounters:   04/10/23 114/69   03/10/23 121/68   02/11/22 119/73       Physical Exam  Vitals and nursing note reviewed.   Constitutional:       Appearance: Normal appearance. He is obese.   HENT:      Head: Normocephalic and atraumatic.      Right Ear: External ear normal.      Left Ear: External ear normal.      Nose: Nose normal.      Mouth/Throat:      Mouth: Mucous membranes are moist.   Eyes:      Conjunctiva/sclera: Conjunctivae normal.      Pupils: Pupils are equal, round, and reactive to light.   Cardiovascular:      Rate and Rhythm: Normal rate and regular rhythm.      Pulses: Normal pulses.      Heart sounds: Normal heart sounds.   Pulmonary:      Effort: Pulmonary effort is normal.      Breath sounds: Normal breath sounds.   Abdominal:      General: Abdomen is flat.      Palpations: Abdomen is soft.   Musculoskeletal:         General: Normal range of motion.      Cervical back: Normal range of motion and neck supple.   Skin:     General: Skin is warm and dry.      Comments: There is inflammation on the left outer side of the left great toe.  Patient has had several elevated glucose levels.  We discussed that we would send out to podiatry.   Neurological:      General: No focal deficit present.      Mental Status: He is alert and oriented to person, place, and time. Mental status is at baseline.   Psychiatric:         Mood and Affect: Mood normal.         Behavior: Behavior normal.         Thought Content: Thought content normal.         Judgment: Judgment normal.          Result Review    Common labs    Common Labs 3/10/23 3/10/23 3/10/23 " 3/10/23    1026 1026 1026 1026   Glucose 100 (A)      BUN 14      Creatinine 1.00      Sodium 138      Potassium 4.4      Chloride 105      Calcium 9.2      Albumin 3.9      Total Bilirubin 0.3      Alkaline Phosphatase 73      AST (SGOT) 41 (A)      ALT (SGPT) 54 (A)      WBC    7.04   Hemoglobin    15.3   Hematocrit    44.5   Platelets    232   Total Cholesterol  156     Triglycerides  143     HDL Cholesterol  43     LDL Cholesterol   88     PSA   1.040    (A) Abnormal value            CMP    CMP 3/10/23   Glucose 100 (A)   BUN 14   Creatinine 1.00   eGFR 92.3   Sodium 138   Potassium 4.4   Chloride 105   Calcium 9.2   Total Protein 7.6   Albumin 3.9   Globulin 3.7   Total Bilirubin 0.3   Alkaline Phosphatase 73   AST (SGOT) 41 (A)   ALT (SGPT) 54 (A)   Albumin/Globulin Ratio 1.1   BUN/Creatinine Ratio 14.0   Anion Gap 7.0   (A) Abnormal value            CBC    CBC 3/10/23   WBC 7.04   RBC 5.16   Hemoglobin 15.3   Hematocrit 44.5   MCV 86.2   MCH 29.7   MCHC 34.4   RDW 12.9   Platelets 232           CBC w/diff    CBC w/Diff 3/10/23   WBC 7.04   RBC 5.16   Hemoglobin 15.3   Hematocrit 44.5   MCV 86.2   MCH 29.7   MCHC 34.4   RDW 12.9   Platelets 232   Neutrophil Rel % 50.7   Immature Granulocyte Rel % 0.4   Lymphocyte Rel % 35.7   Monocyte Rel % 9.1   Eosinophil Rel % 3.4   Basophil Rel % 0.7           Lipid Panel    Lipid Panel 3/10/23   Total Cholesterol 156   Triglycerides 143   HDL Cholesterol 43   VLDL Cholesterol 25   LDL Cholesterol  88   LDL/HDL Ratio 1.96           TSH    TSH 3/10/23   TSH 2.940               Lab Results   Component Value Date    CHAU58FQ 34.2 03/10/2023     Lab Results   Component Value Date    TESTOSTERONE 455.00 09/10/2021             No Images in the past 120 days found..      The above data has been reviewed by TAMY Najera 04/10/2023 09:28 EDT.          Patient Care Team:  Staci Granado APRN as PCP - General (Emergency Medicine)    Class 3 Severe Obesity (BMI >=40).  Obesity-related health conditions include the following: Elevated blood sugar levels. Obesity is improving with treatment. BMI is is above average; BMI management plan is completed. We discussed portion control, increasing exercise and pharmacologic options including Phentermine 30 mg.       ASSESSMENT & PLAN    Diagnoses and all orders for this visit:    1. Encounter for weight management (Primary)  Comments:  Have sent in phentermine 30 mg tablets daily to Dr. Hobson.     2. Ingrown left big toenail  Comments:  Have referred patient to podiatry.  Orders:  -     Ambulatory Referral to Podiatry    Other orders  -     sulfamethoxazole-trimethoprim (BACTRIM DS,SEPTRA DS) 800-160 MG per tablet; Take 1 tablet by mouth 2 (Two) Times a Day for 5 days.  Dispense: 10 tablet; Refill: 0         Tobacco Use: Low Risk    • Smoking Tobacco Use: Never   • Smokeless Tobacco Use: Never   • Passive Exposure: Not on file       Follow Up     Return in about 3 months (around 7/10/2023) for recheck phetermine.      Patient was given instructions and counseling regarding his condition or for health maintenance advice. Please see specific information pulled into the AVS if appropriate.   I have reviewed information obtained and documented by others and I have confirmed the accuracy of this documented note.    TAMY Najera        Answers for HPI/ROS submitted by the patient on 4/3/2023  What is the primary reason for your visit?: Other  Please describe your symptoms.: Follow up  Have you had these symptoms before?: No  How long have you been having these symptoms?: Greater than 2 weeks  Please list any medications you are currently taking for this condition.: Phentermine  Please describe any probable cause for these symptoms. : Na

## 2023-05-08 ENCOUNTER — OFFICE VISIT (OUTPATIENT)
Dept: PODIATRY | Facility: CLINIC | Age: 48
End: 2023-05-08
Payer: COMMERCIAL

## 2023-05-08 VITALS
OXYGEN SATURATION: 96 % | BODY MASS INDEX: 40.43 KG/M2 | HEIGHT: 69 IN | SYSTOLIC BLOOD PRESSURE: 131 MMHG | WEIGHT: 273 LBS | HEART RATE: 89 BPM | DIASTOLIC BLOOD PRESSURE: 76 MMHG | TEMPERATURE: 97.8 F

## 2023-05-08 DIAGNOSIS — B35.1 ONYCHOMYCOSIS: ICD-10-CM

## 2023-05-08 DIAGNOSIS — L60.0 INGROWN TOENAIL: Primary | ICD-10-CM

## 2023-05-08 NOTE — PROGRESS NOTES
[facility] - PODIATRY    Today's Date: 05/08/23    Patient Name: Hardik Woo  MRN: [id number]  CSN: [id number]  PCP: Staci Granado APRN,   Referring Provider: Staci Granado APRN    SUBJECTIVE     Chief Complaint   Patient presents with   • Left Foot - Establish Care, Ingrown Toenail     Great toe, was on abx and is much better      HPI: Hardik Woo, a 47 y.o.male, presents to clinic.    Patient is a 47-year-old male presenting with a ingrown toenail of his left great toe.  Patient states it was hurting around Easter he has antibiotics and it has not hurt him since then.  He is here to for further treatment.  It is not hurting today and has not for the last week or 2.    Past Medical History:   Diagnosis Date   • Hypogonadism male 12/22/2019   • Ingrown toenail    • Screening for colon cancer 2014     Past Surgical History:   Procedure Laterality Date   • ANKLE OPEN REDUCTION INTERNAL FIXATION  2-20-21   • COLONOSCOPY  2011,2020     Family History   Problem Relation Age of Onset   • Heart disease Mother    • Diabetes Mother    • Heart disease Father    • Heart disease Maternal Grandfather    • Other Other         liz neoplasn, malignant   • Other Other         no family history of colorectal cancer     Social History     Socioeconomic History   • Marital status:    Tobacco Use   • Smoking status: Never   • Smokeless tobacco: Never   Vaping Use   • Vaping Use: Never used   Substance and Sexual Activity   • Alcohol use: Yes     Alcohol/week: 4.0 standard drinks     Types: 4 Cans of beer per week     Comment: I dont drink a lot   • Drug use: Never   • Sexual activity: Yes     No Known Allergies  Current Outpatient Medications   Medication Sig Dispense Refill   • multivitamin with minerals tablet tablet Take 1 tablet by mouth Daily.     • Omega-3 Fatty Acids (fish oil) 1000 MG capsule capsule Take 1 capsule by mouth Daily With Breakfast.     • phentermine 30 MG capsule Take 1  capsule by mouth Every Morning for 30 days. 30 capsule 0   • vitamin D3 125 MCG (5000 UT) capsule capsule Take 1 capsule by mouth Daily.       No current facility-administered medications for this visit.     Review of Systems   All other systems reviewed and are negative.      OBJECTIVE     Vitals:    05/08/23 0747   BP: 131/76   Pulse: 89   Temp: 97.8 °F (36.6 °C)   SpO2: 96%       WBC   Date Value Ref Range Status   03/10/2023 7.04 3.40 - 10.80 10*3/mm3 Final     RBC   Date Value Ref Range Status   03/10/2023 5.16 4.14 - 5.80 10*6/mm3 Final     Hemoglobin   Date Value Ref Range Status   03/10/2023 15.3 13.0 - 17.7 g/dL Final     Hematocrit   Date Value Ref Range Status   03/10/2023 44.5 37.5 - 51.0 % Final     MCV   Date Value Ref Range Status   03/10/2023 86.2 79.0 - 97.0 fL Final     MCH   Date Value Ref Range Status   03/10/2023 29.7 26.6 - 33.0 pg Final     MCHC   Date Value Ref Range Status   03/10/2023 34.4 31.5 - 35.7 g/dL Final     RDW   Date Value Ref Range Status   03/10/2023 12.9 12.3 - 15.4 % Final     RDW-SD   Date Value Ref Range Status   03/10/2023 41.5 37.0 - 54.0 fl Final     MPV   Date Value Ref Range Status   03/10/2023 10.4 6.0 - 12.0 fL Final     Platelets   Date Value Ref Range Status   03/10/2023 232 140 - 450 10*3/mm3 Final     Neutrophil %   Date Value Ref Range Status   03/10/2023 50.7 42.7 - 76.0 % Final     Lymphocyte %   Date Value Ref Range Status   03/10/2023 35.7 19.6 - 45.3 % Final     Monocyte %   Date Value Ref Range Status   03/10/2023 9.1 5.0 - 12.0 % Final     Eosinophil %   Date Value Ref Range Status   03/10/2023 3.4 0.3 - 6.2 % Final     Basophil %   Date Value Ref Range Status   03/10/2023 0.7 0.0 - 1.5 % Final     Immature Grans %   Date Value Ref Range Status   03/10/2023 0.4 0.0 - 0.5 % Final     Neutrophils, Absolute   Date Value Ref Range Status   03/10/2023 3.57 1.70 - 7.00 10*3/mm3 Final     Lymphocytes, Absolute   Date Value Ref Range Status   03/10/2023 2.51 0.70  - 3.10 10*3/mm3 Final     Monocytes, Absolute   Date Value Ref Range Status   03/10/2023 0.64 0.10 - 0.90 10*3/mm3 Final     Eosinophils, Absolute   Date Value Ref Range Status   03/10/2023 0.24 0.00 - 0.40 10*3/mm3 Final     Basophils, Absolute   Date Value Ref Range Status   03/10/2023 0.05 0.00 - 0.20 10*3/mm3 Final     Immature Grans, Absolute   Date Value Ref Range Status   03/10/2023 0.03 0.00 - 0.05 10*3/mm3 Final     nRBC   Date Value Ref Range Status   03/10/2023 0.0 0.0 - 0.2 /100 WBC Final         Lab Results   Component Value Date    GLUCOSE 100 (H) 03/10/2023    BUN 14 03/10/2023    CREATININE 1.00 03/10/2023    EGFRIFNONA 51 (L) 09/10/2021    BCR 14.0 03/10/2023    K 4.4 03/10/2023    CO2 26.0 03/10/2023    CALCIUM 9.2 03/10/2023    ALBUMIN 3.9 03/10/2023    LABIL2 1.0 (L) 05/28/2021    AST 41 (H) 03/10/2023    ALT 54 (H) 03/10/2023       Patient seen in no apparent distress.      PHYSICAL EXAM:     Foot/Ankle Exam    GENERAL  Appearance:  appears stated age  Orientation:  AAOx3  Affect:  appropriate  Gait:  unimpaired  Assistance:  independent  Right shoe gear: casual shoe  Left shoe gear: casual shoe    VASCULAR     Right Foot Vascularity   Normal vascular exam    Dorsalis pedis:  2+  Posterior tibial:  2+  Skin temperature:  warm  Edema grading:  None  CFT:  < 3 seconds  Pedal hair growth:  Present  Varicosities:  none     Left Foot Vascularity   Normal vascular exam    Dorsalis pedis:  2+  Posterior tibial:  2+  Skin temperature:  warm  Edema grading:  None  CFT:  < 3 seconds  Pedal hair growth:  Present  Varicosities:  none     NEUROLOGIC     Right Foot Neurologic   Normal sensation    Light touch sensation: normal  Vibratory sensation: normal  Hot/Cold sensation: normal  Protective Sensation using [place]n Monofilament:   Sites intact: 10  Sites tested: 10     Left Foot Neurologic   Normal sensation    Light touch sensation: normal  Vibratory sensation: normal  Hot/Cold sensation:   normal  Protective Sensation using [place]-Diann Monofilament:   Sites intact: 10  Sites tested: 10    MUSCLE STRENGTH     Right Foot Muscle Strength   Foot dorsiflexion:  4  Foot plantar flexion:  4  Foot inversion:  4  Foot eversion:  4     Left Foot Muscle Strength   Foot dorsiflexion:  4  Foot plantar flexion:  4  Foot inversion:  4  Foot eversion:  4    RANGE OF MOTION     Right Foot Range of Motion   Foot and ankle ROM within normal limits       Left Foot Range of Motion   Foot and ankle ROM within normal limits      DERMATOLOGIC      Right Foot Dermatologic   Skin  Right foot skin is intact.      Left Foot Dermatologic   Skin  Left foot skin is intact.       RADIOLOGY:      No results found.    ASSESSMENT/PLAN     Diagnoses and all orders for this visit:    1. Ingrown toenail (Primary)    2. Onychomycosis      Resolved ingrown toenail.  Patient to return to clinic as needed.    Comprehensive lower extremity examination and evaluation was performed.    Discussed findings and treatment plan including risks, benefits, and treatment options with patient in detail. Patient agreed with treatment plan.    Medications and allergies reviewed.  Reviewed available lab values along with other pertinent labs.  These were discussed with the patient.    An After Visit Summary was printed and given to the patient at discharge, including (if requested) any available informative/educational handouts regarding diagnosis, treatment, or medications. All questions were answered to patient/family satisfaction. Should symptoms fail to improve or worsen they agree to call or return to clinic or to go to the Emergency Department. Discussed the importance of following up with any needed screening tests/labs/specialist appointments and any requested follow-up recommended by me today. Importance of maintaining follow-up discussed and patient accepts that missed appointments can delay diagnosis and potentially lead to worsening of  conditions.    Return in about 3 months (around 8/8/2023)., or sooner if acute issues arise.    This document has been electronically signed by Ishaan Hwang DPM on May 8, 2023 10:42 EDT

## 2023-05-10 ENCOUNTER — PATIENT ROUNDING (BHMG ONLY) (OUTPATIENT)
Dept: PODIATRY | Facility: CLINIC | Age: 48
End: 2023-05-10
Payer: COMMERCIAL

## 2023-05-10 NOTE — PROGRESS NOTES
A [employer] message has been sent to the patient for PATIENT ROUNDING with [facility]

## 2023-05-12 RX ORDER — PHENTERMINE HYDROCHLORIDE 30 MG/1
30 CAPSULE ORAL EVERY MORNING
Qty: 30 CAPSULE | Refills: 0 | Status: SHIPPED | OUTPATIENT
Start: 2023-05-12 | End: 2023-06-11

## 2023-05-12 NOTE — TELEPHONE ENCOUNTER
Med refill, patient last seen 4/10/23 w Staci Granado.  UDS and consent 3/10/23. Med last filled 4/10/23.

## 2023-06-12 RX ORDER — PHENTERMINE HYDROCHLORIDE 30 MG/1
30 CAPSULE ORAL EVERY MORNING
Qty: 30 CAPSULE | Refills: 0 | Status: SHIPPED | OUTPATIENT
Start: 2023-06-12 | End: 2023-07-12

## 2023-06-12 NOTE — TELEPHONE ENCOUNTER
Med refill, patient last seen rich Granado 4/10/23.  UDS and consent 3/10/23.  Med last filled 5/12/23.

## 2023-07-03 DIAGNOSIS — M19.012 OSTEOARTHRITIS OF LEFT SHOULDER, UNSPECIFIED OSTEOARTHRITIS TYPE: ICD-10-CM

## 2023-07-03 DIAGNOSIS — M25.512 LEFT SHOULDER PAIN, UNSPECIFIED CHRONICITY: Primary | ICD-10-CM

## 2023-08-21 RX ORDER — MELOXICAM 7.5 MG/1
7.5 TABLET ORAL DAILY
Qty: 30 TABLET | Refills: 0 | Status: CANCELLED | OUTPATIENT
Start: 2023-08-21 | End: 2023-09-20

## 2023-08-21 RX ORDER — PHENTERMINE HYDROCHLORIDE 30 MG/1
30 CAPSULE ORAL EVERY MORNING
Qty: 30 CAPSULE | Refills: 0 | Status: SHIPPED | OUTPATIENT
Start: 2023-08-21 | End: 2023-09-20

## 2023-09-18 RX ORDER — PHENTERMINE HYDROCHLORIDE 30 MG/1
30 CAPSULE ORAL EVERY MORNING
Qty: 30 CAPSULE | Refills: 0 | Status: SHIPPED | OUTPATIENT
Start: 2023-09-18 | End: 2023-10-18

## 2023-09-18 NOTE — TELEPHONE ENCOUNTER
Med refill, patient last seen rich Granado 7/3/23.  Next appt 1/5/24.  UDS 7/3/23, consent 3/10/23.  Last filled 8/21/23.

## 2023-09-19 RX ORDER — MELOXICAM 7.5 MG/1
TABLET ORAL
Qty: 30 TABLET | Refills: 0 | Status: SHIPPED | OUTPATIENT
Start: 2023-09-19

## 2023-10-16 RX ORDER — MELOXICAM 7.5 MG/1
TABLET ORAL
Qty: 30 TABLET | Refills: 0 | Status: SHIPPED | OUTPATIENT
Start: 2023-10-16

## 2023-10-16 RX ORDER — PHENTERMINE HYDROCHLORIDE 30 MG/1
30 CAPSULE ORAL EVERY MORNING
Qty: 30 CAPSULE | Refills: 0 | Status: SHIPPED | OUTPATIENT
Start: 2023-10-16 | End: 2023-11-15

## 2023-10-16 NOTE — TELEPHONE ENCOUNTER
Med refill, patient last seen 7/3/23 rich Small. Next appt rich Coleman 1/5/24.  UDS 7/3/23, consent 3/10/23.  Last filled 9/20/23.

## 2023-10-17 DIAGNOSIS — R73.03 PREDIABETES: ICD-10-CM

## 2023-10-17 DIAGNOSIS — Z00.00 ANNUAL PHYSICAL EXAM: ICD-10-CM

## 2023-10-17 DIAGNOSIS — Z13.220 SCREENING FOR LIPID DISORDERS: ICD-10-CM

## 2023-10-17 DIAGNOSIS — I10 PRIMARY HYPERTENSION: Primary | ICD-10-CM

## 2023-10-17 DIAGNOSIS — Z13.29 SCREENING FOR THYROID DISORDER: ICD-10-CM

## 2023-11-20 RX ORDER — MELOXICAM 7.5 MG/1
TABLET ORAL
Qty: 90 TABLET | Refills: 0 | Status: SHIPPED | OUTPATIENT
Start: 2023-11-20

## 2023-12-06 RX ORDER — PHENTERMINE HYDROCHLORIDE 30 MG/1
30 CAPSULE ORAL EVERY MORNING
Qty: 30 CAPSULE | Refills: 0 | OUTPATIENT
Start: 2023-12-06

## 2023-12-09 ENCOUNTER — PATIENT MESSAGE (OUTPATIENT)
Dept: FAMILY MEDICINE CLINIC | Facility: CLINIC | Age: 48
End: 2023-12-09
Payer: COMMERCIAL

## 2023-12-12 RX ORDER — MELOXICAM 7.5 MG/1
7.5 TABLET ORAL DAILY
Qty: 90 TABLET | Refills: 0 | Status: SHIPPED | OUTPATIENT
Start: 2023-12-12

## 2023-12-12 NOTE — TELEPHONE ENCOUNTER
From: Hardik Woo  To: Staci Granado  Sent: 12/9/2023 8:27 AM EST  Subject: Prescription refill    Ever since Dr Granado left I have not been able to get my prescription refilled. I have asked several times what is going on and even tried calling the office but can’t get through.

## 2023-12-22 ENCOUNTER — LAB (OUTPATIENT)
Dept: LAB | Facility: HOSPITAL | Age: 48
End: 2023-12-22
Payer: COMMERCIAL

## 2023-12-22 DIAGNOSIS — Z00.00 ANNUAL PHYSICAL EXAM: ICD-10-CM

## 2023-12-22 DIAGNOSIS — Z13.29 SCREENING FOR THYROID DISORDER: ICD-10-CM

## 2023-12-22 DIAGNOSIS — R73.03 PREDIABETES: ICD-10-CM

## 2023-12-22 DIAGNOSIS — Z13.220 SCREENING FOR LIPID DISORDERS: ICD-10-CM

## 2023-12-22 DIAGNOSIS — I10 PRIMARY HYPERTENSION: ICD-10-CM

## 2023-12-22 LAB
ALBUMIN SERPL-MCNC: 3.7 G/DL (ref 3.5–5.2)
ALBUMIN/GLOB SERPL: 1 G/DL
ALP SERPL-CCNC: 61 U/L (ref 39–117)
ALT SERPL W P-5'-P-CCNC: 63 U/L (ref 1–41)
ANION GAP SERPL CALCULATED.3IONS-SCNC: 8.5 MMOL/L (ref 5–15)
AST SERPL-CCNC: 42 U/L (ref 1–40)
BASOPHILS # BLD AUTO: 0.05 10*3/MM3 (ref 0–0.2)
BASOPHILS NFR BLD AUTO: 0.8 % (ref 0–1.5)
BILIRUB SERPL-MCNC: 0.4 MG/DL (ref 0–1.2)
BUN SERPL-MCNC: 19 MG/DL (ref 6–20)
BUN/CREAT SERPL: 19.6 (ref 7–25)
CALCIUM SPEC-SCNC: 9.4 MG/DL (ref 8.6–10.5)
CHLORIDE SERPL-SCNC: 105 MMOL/L (ref 98–107)
CHOLEST SERPL-MCNC: 164 MG/DL (ref 0–200)
CO2 SERPL-SCNC: 23.5 MMOL/L (ref 22–29)
CREAT SERPL-MCNC: 0.97 MG/DL (ref 0.76–1.27)
DEPRECATED RDW RBC AUTO: 40.4 FL (ref 37–54)
EGFRCR SERPLBLD CKD-EPI 2021: 95.1 ML/MIN/1.73
EOSINOPHIL # BLD AUTO: 0.22 10*3/MM3 (ref 0–0.4)
EOSINOPHIL NFR BLD AUTO: 3.6 % (ref 0.3–6.2)
ERYTHROCYTE [DISTWIDTH] IN BLOOD BY AUTOMATED COUNT: 13 % (ref 12.3–15.4)
GLOBULIN UR ELPH-MCNC: 3.6 GM/DL
GLUCOSE SERPL-MCNC: 109 MG/DL (ref 65–99)
HBA1C MFR BLD: 6.3 % (ref 4.8–5.6)
HCT VFR BLD AUTO: 44.7 % (ref 37.5–51)
HDLC SERPL-MCNC: 50 MG/DL (ref 40–60)
HGB BLD-MCNC: 14.6 G/DL (ref 13–17.7)
IMM GRANULOCYTES # BLD AUTO: 0.03 10*3/MM3 (ref 0–0.05)
IMM GRANULOCYTES NFR BLD AUTO: 0.5 % (ref 0–0.5)
LDLC SERPL CALC-MCNC: 104 MG/DL (ref 0–100)
LDLC/HDLC SERPL: 2.08 {RATIO}
LYMPHOCYTES # BLD AUTO: 2.43 10*3/MM3 (ref 0.7–3.1)
LYMPHOCYTES NFR BLD AUTO: 39.3 % (ref 19.6–45.3)
MCH RBC QN AUTO: 28.3 PG (ref 26.6–33)
MCHC RBC AUTO-ENTMCNC: 32.7 G/DL (ref 31.5–35.7)
MCV RBC AUTO: 86.6 FL (ref 79–97)
MONOCYTES # BLD AUTO: 0.58 10*3/MM3 (ref 0.1–0.9)
MONOCYTES NFR BLD AUTO: 9.4 % (ref 5–12)
NEUTROPHILS NFR BLD AUTO: 2.87 10*3/MM3 (ref 1.7–7)
NEUTROPHILS NFR BLD AUTO: 46.4 % (ref 42.7–76)
NRBC BLD AUTO-RTO: 0 /100 WBC (ref 0–0.2)
PLATELET # BLD AUTO: 217 10*3/MM3 (ref 140–450)
PMV BLD AUTO: 10.4 FL (ref 6–12)
POTASSIUM SERPL-SCNC: 4.9 MMOL/L (ref 3.5–5.2)
PROT SERPL-MCNC: 7.3 G/DL (ref 6–8.5)
RBC # BLD AUTO: 5.16 10*6/MM3 (ref 4.14–5.8)
SODIUM SERPL-SCNC: 137 MMOL/L (ref 136–145)
T4 FREE SERPL-MCNC: 1.16 NG/DL (ref 0.93–1.7)
TRIGL SERPL-MCNC: 49 MG/DL (ref 0–150)
TSH SERPL DL<=0.05 MIU/L-ACNC: 3.35 UIU/ML (ref 0.27–4.2)
VLDLC SERPL-MCNC: 10 MG/DL (ref 5–40)
WBC NRBC COR # BLD AUTO: 6.18 10*3/MM3 (ref 3.4–10.8)

## 2023-12-22 PROCEDURE — 83036 HEMOGLOBIN GLYCOSYLATED A1C: CPT

## 2023-12-22 PROCEDURE — 80061 LIPID PANEL: CPT

## 2023-12-22 PROCEDURE — 80050 GENERAL HEALTH PANEL: CPT

## 2023-12-22 PROCEDURE — 84439 ASSAY OF FREE THYROXINE: CPT

## 2023-12-22 PROCEDURE — 36415 COLL VENOUS BLD VENIPUNCTURE: CPT

## 2024-01-05 ENCOUNTER — OFFICE VISIT (OUTPATIENT)
Dept: FAMILY MEDICINE CLINIC | Facility: CLINIC | Age: 49
End: 2024-01-05
Payer: COMMERCIAL

## 2024-01-05 VITALS
HEIGHT: 69 IN | BODY MASS INDEX: 39.9 KG/M2 | DIASTOLIC BLOOD PRESSURE: 69 MMHG | HEART RATE: 71 BPM | OXYGEN SATURATION: 98 % | SYSTOLIC BLOOD PRESSURE: 134 MMHG | WEIGHT: 269.4 LBS

## 2024-01-05 DIAGNOSIS — Z76.89 ENCOUNTER TO ESTABLISH CARE: ICD-10-CM

## 2024-01-05 DIAGNOSIS — M25.512 LEFT SHOULDER PAIN, UNSPECIFIED CHRONICITY: ICD-10-CM

## 2024-01-05 DIAGNOSIS — Z00.00 ANNUAL PHYSICAL EXAM: Primary | ICD-10-CM

## 2024-01-05 DIAGNOSIS — E29.1 HYPOGONADISM, MALE: ICD-10-CM

## 2024-01-05 DIAGNOSIS — R73.03 PREDIABETES: ICD-10-CM

## 2024-01-05 DIAGNOSIS — E78.00 ELEVATED LDL CHOLESTEROL LEVEL: ICD-10-CM

## 2024-01-05 DIAGNOSIS — Z12.5 SCREENING FOR PROSTATE CANCER: ICD-10-CM

## 2024-01-05 PROCEDURE — 99396 PREV VISIT EST AGE 40-64: CPT

## 2024-01-05 NOTE — PROGRESS NOTES
Chief Complaint  Establish Care and Annual Exam    SUBJECTIVE  Hardik Woo presents to [facility]    History of Present Illness  Patient is a 48-year-old male who presents today to establish care and for annual physical exam.  Previous PCP was TAMY Najera.  He needs chronic condition management of left shoulder pain, hypogonadism, obesity, prediabetes, elevated LDL.    Left shoulder pain-patient currently takes meloxicam daily for this.  Patient reports most of the time this relieves he has occasional flareups that does not touch.  He feels stable on medication at this time does not wish to be changed.  Denies any adverse effects medication.    Obesity-patient was previously on phentermine.  Advised patient that he has to be off work for at least 6 at least 6 months before he can restart.  Patient reports he has been going to the gym several days a week.    Prediabetes-recent A1c was 6.3.  Patient reports he believes this may be due to him eating more candy over the past few months due to the holiday season.  Patient reports he has started watching, sugar intake he has been switching from white carbs to whole-grain whole-wheat.  We will reassess in 3 months.  Discussed with patient about medication options but we are going to withhold at this time.    Elevated LDL-patient's LDL was noted to be mildly elevated at 104.  All previous lipid panels have been within normal limits.  Patient reports he does travel for work and sometimes eats unhealthy due to this.  Patient also had elevated liver enzymes, patient refusing liver ultrasound at this time he would like to try diet to see if this helps improve.    Patient declines vaccines today. Patient understands the risks of not having.     Patient is due repeat labs in 3 months.  Orders placed at today's visit. Discussed with patient that these are fasting labs.     No other concerns or complaints at this time.  Patient denies  "any diarrhea, constipation, blood in stool, urinary urgency, frequency, or dysuria, chest pain, shortness of breath or syncope.           Past Medical History:   Diagnosis Date    Hypogonadism male 12/22/2019    Ingrown toenail     Screening for colon cancer 2014      Family History   Problem Relation Age of Onset    Heart disease Mother     Diabetes Mother     Heart disease Father     Heart disease Maternal Grandfather     Other Other         liz neoplasn, malignant    Other Other         no family history of colorectal cancer      Past Surgical History:   Procedure Laterality Date    ANKLE OPEN REDUCTION INTERNAL FIXATION  2-20-21    COLONOSCOPY  2011,2020        Current Outpatient Medications:     meloxicam (MOBIC) 7.5 MG tablet, Take 1 tablet by mouth Daily., Disp: 90 tablet, Rfl: 0    multivitamin with minerals tablet tablet, Take 1 tablet by mouth Daily., Disp: , Rfl:     Omega-3 Fatty Acids (fish oil) 1000 MG capsule capsule, Take 1 capsule by mouth Daily With Breakfast., Disp: , Rfl:     vitamin D3 125 MCG (5000 UT) capsule capsule, Take 1 capsule by mouth Daily., Disp: 90 capsule, Rfl: 0    OBJECTIVE  Vital Signs:   /69 (BP Location: Left arm, Patient Position: Sitting, Cuff Size: Large Adult)   Pulse 71   Ht 175.3 cm (69\")   Wt 122 kg (269 lb 6.4 oz)   SpO2 98%   BMI 39.78 kg/m²    Estimated body mass index is 39.78 kg/m² as calculated from the following:    Height as of this encounter: 175.3 cm (69\").    Weight as of this encounter: 122 kg (269 lb 6.4 oz).     Wt Readings from Last 3 Encounters:   01/05/24 122 kg (269 lb 6.4 oz)   07/03/23 120 kg (263 lb 12.8 oz)   05/08/23 124 kg (273 lb)     BP Readings from Last 3 Encounters:   01/05/24 134/69   07/03/23 106/73   05/08/23 131/76       Physical Exam  Vitals reviewed.   Constitutional:       General: He is awake. He is not in acute distress.     Appearance: He is well-developed and well-groomed. He is obese. He is not ill-appearing.   HENT: "      Head: Normocephalic and atraumatic.      Right Ear: Tympanic membrane, ear canal and external ear normal.      Left Ear: Tympanic membrane, ear canal and external ear normal.      Nose: Nose normal.      Mouth/Throat:      Mouth: Mucous membranes are moist.      Pharynx: Oropharynx is clear. No oropharyngeal exudate or posterior oropharyngeal erythema.   Eyes:      Extraocular Movements: Extraocular movements intact.      Conjunctiva/sclera: Conjunctivae normal.      Pupils: Pupils are equal, round, and reactive to light.   Neck:      Thyroid: No thyroid mass, thyromegaly or thyroid tenderness.      Vascular: No carotid bruit.   Cardiovascular:      Rate and Rhythm: Normal rate and regular rhythm.      Heart sounds: Normal heart sounds. No murmur heard.  Pulmonary:      Effort: Pulmonary effort is normal. No respiratory distress.      Breath sounds: Normal breath sounds.   Chest:      Chest wall: No tenderness.   Abdominal:      General: Abdomen is flat. Bowel sounds are normal. There is no distension.      Palpations: Abdomen is soft. There is no mass.      Tenderness: There is no abdominal tenderness. There is no guarding.   Musculoskeletal:         General: No swelling or tenderness. Normal range of motion.      Cervical back: Normal range of motion and neck supple. No rigidity or tenderness.   Lymphadenopathy:      Cervical: No cervical adenopathy.   Skin:     General: Skin is warm and dry.      Findings: No rash.   Neurological:      General: No focal deficit present.      Mental Status: He is alert and oriented to person, place, and time. Mental status is at baseline.      Gait: Gait normal.   Psychiatric:         Mood and Affect: Mood normal.         Behavior: Behavior normal. Behavior is cooperative.         Thought Content: Thought content normal.         Judgment: Judgment normal.          Result Review    Common labs          3/10/2023    10:26 7/3/2023    07:53 12/22/2023    06:45   Common Labs    Glucose 100  104  109    BUN 14  14  19    Creatinine 1.00  1.07  0.97    Sodium 138  138  137    Potassium 4.4  3.9  4.9    Chloride 105  106  105    Calcium 9.2  9.5  9.4    Albumin 3.9  3.9  3.7    Total Bilirubin 0.3  0.6  0.4    Alkaline Phosphatase 73  64  61    AST (SGOT) 41  33  42    ALT (SGPT) 54  41  63    WBC 7.04   6.18    Hemoglobin 15.3   14.6    Hematocrit 44.5   44.7    Platelets 232   217    Total Cholesterol 156   164    Triglycerides 143   49    HDL Cholesterol 43   50    LDL Cholesterol  88   104    Hemoglobin A1C  6.00  6.30    PSA 1.040          No Images in the past 120 days found..      The above data has been reviewed by TAMY Mooney 01/05/2024 09:35 EST.          Patient Care Team:  Debora Coleman APRN as PCP - General (Nurse Practitioner)            ASSESSMENT & PLAN    Diagnoses and all orders for this visit:    1. Annual physical exam (Primary)  Comments:  Preventative counseling  Healthy diet  Daily exercise  Adequate sleep    2. Screening for prostate cancer  Comments:  PSA due after March 30  Orders:  -     PSA SCREENING; Future    3. Hypogonadism, male  Comments:  Repeat testosterone level in 3 months  Overview:  Pt stopped his testosterone and arimidex for HRT.  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 Elevated LDL cholesterol level  Comments:  Diet lifestyle changes discussed, repeat in 3 months  Orders:  -     Lipid panel; Future    5. Prediabetes  Comments:  Discussed diet lifestyle changes, requesting to hold off on medications at this time  Orders:  -     Hemoglobin A1c; Future    6. Encounter to establish care  Comments:  Medical history and medications reviewed    7. Left shoulder pain, unspecified chronicity  Comments:  Stable on meloxicam 7.5 mg, continue         Tobacco Use: Low Risk  (1/5/2024)    Patient History     Smoking Tobacco Use: Never     Smokeless Tobacco Use: Never     Passive Exposure: Not on file        Follow Up     Return in about 6 months (around 7/5/2024) for Next scheduled follow up.      Patient was given instructions and counseling regarding his condition or for health maintenance advice. Please see specific information pulled into the AVS if appropriate.   I have reviewed information obtained and documented by others and I have confirmed the accuracy of this documented note.    Debora Coleman, APRN

## 2024-02-20 RX ORDER — MELOXICAM 7.5 MG/1
TABLET ORAL
Qty: 90 TABLET | Refills: 0 | Status: SHIPPED | OUTPATIENT
Start: 2024-02-20

## 2024-03-22 ENCOUNTER — LAB (OUTPATIENT)
Dept: LAB | Facility: HOSPITAL | Age: 49
End: 2024-03-22
Payer: COMMERCIAL

## 2024-03-22 DIAGNOSIS — E29.1 HYPOGONADISM, MALE: ICD-10-CM

## 2024-03-22 DIAGNOSIS — R73.03 PREDIABETES: ICD-10-CM

## 2024-03-22 DIAGNOSIS — Z12.5 SCREENING FOR PROSTATE CANCER: ICD-10-CM

## 2024-03-22 DIAGNOSIS — E78.00 ELEVATED LDL CHOLESTEROL LEVEL: ICD-10-CM

## 2024-03-22 LAB
CHOLEST SERPL-MCNC: 159 MG/DL (ref 0–200)
HBA1C MFR BLD: 5.9 % (ref 4.8–5.6)
HDLC SERPL-MCNC: 52 MG/DL (ref 40–60)
LDLC SERPL CALC-MCNC: 94 MG/DL (ref 0–100)
LDLC/HDLC SERPL: 1.8 {RATIO}
PSA SERPL-MCNC: 1.19 NG/ML (ref 0–4)
TESTOST SERPL-MCNC: 495 NG/DL (ref 193–740)
TRIGL SERPL-MCNC: 67 MG/DL (ref 0–150)
VLDLC SERPL-MCNC: 13 MG/DL (ref 5–40)

## 2024-03-22 PROCEDURE — 80061 LIPID PANEL: CPT

## 2024-03-22 PROCEDURE — G0103 PSA SCREENING: HCPCS

## 2024-03-22 PROCEDURE — 83036 HEMOGLOBIN GLYCOSYLATED A1C: CPT

## 2024-03-22 PROCEDURE — 36415 COLL VENOUS BLD VENIPUNCTURE: CPT

## 2024-03-22 PROCEDURE — 84403 ASSAY OF TOTAL TESTOSTERONE: CPT

## 2024-04-16 RX ORDER — MELOXICAM 7.5 MG/1
7.5 TABLET ORAL DAILY
Qty: 90 TABLET | Refills: 0 | Status: SHIPPED | OUTPATIENT
Start: 2024-04-16

## 2024-05-21 RX ORDER — MELOXICAM 7.5 MG/1
TABLET ORAL
Qty: 90 TABLET | Refills: 0 | OUTPATIENT
Start: 2024-05-21

## 2024-06-11 ENCOUNTER — PATIENT MESSAGE (OUTPATIENT)
Dept: FAMILY MEDICINE CLINIC | Facility: CLINIC | Age: 49
End: 2024-06-11
Payer: COMMERCIAL

## 2024-06-11 DIAGNOSIS — R73.01 IMPAIRED FASTING GLUCOSE: ICD-10-CM

## 2024-06-11 DIAGNOSIS — E78.00 ELEVATED LDL CHOLESTEROL LEVEL: Primary | ICD-10-CM

## 2024-06-25 ENCOUNTER — LAB (OUTPATIENT)
Dept: LAB | Facility: HOSPITAL | Age: 49
End: 2024-06-25
Payer: COMMERCIAL

## 2024-06-25 DIAGNOSIS — E78.00 ELEVATED LDL CHOLESTEROL LEVEL: ICD-10-CM

## 2024-06-25 DIAGNOSIS — R73.01 IMPAIRED FASTING GLUCOSE: ICD-10-CM

## 2024-06-25 LAB
ALBUMIN SERPL-MCNC: 3.9 G/DL (ref 3.5–5.2)
ALBUMIN/GLOB SERPL: 1.3 G/DL
ALP SERPL-CCNC: 60 U/L (ref 39–117)
ALT SERPL W P-5'-P-CCNC: 35 U/L (ref 1–41)
ANION GAP SERPL CALCULATED.3IONS-SCNC: 9 MMOL/L (ref 5–15)
AST SERPL-CCNC: 24 U/L (ref 1–40)
BILIRUB SERPL-MCNC: 0.4 MG/DL (ref 0–1.2)
BUN SERPL-MCNC: 19 MG/DL (ref 6–20)
BUN/CREAT SERPL: 20.2 (ref 7–25)
CALCIUM SPEC-SCNC: 9 MG/DL (ref 8.6–10.5)
CHLORIDE SERPL-SCNC: 108 MMOL/L (ref 98–107)
CHOLEST SERPL-MCNC: 158 MG/DL (ref 0–200)
CO2 SERPL-SCNC: 23 MMOL/L (ref 22–29)
CREAT SERPL-MCNC: 0.94 MG/DL (ref 0.76–1.27)
EGFRCR SERPLBLD CKD-EPI 2021: 98.8 ML/MIN/1.73
GLOBULIN UR ELPH-MCNC: 3.1 GM/DL
GLUCOSE SERPL-MCNC: 105 MG/DL (ref 65–99)
HBA1C MFR BLD: 6 % (ref 4.8–5.6)
HDLC SERPL-MCNC: 49 MG/DL (ref 40–60)
LDLC SERPL CALC-MCNC: 94 MG/DL (ref 0–100)
LDLC/HDLC SERPL: 1.91 {RATIO}
POTASSIUM SERPL-SCNC: 4 MMOL/L (ref 3.5–5.2)
PROT SERPL-MCNC: 7 G/DL (ref 6–8.5)
SODIUM SERPL-SCNC: 140 MMOL/L (ref 136–145)
TRIGL SERPL-MCNC: 76 MG/DL (ref 0–150)
VLDLC SERPL-MCNC: 15 MG/DL (ref 5–40)

## 2024-06-25 PROCEDURE — 36415 COLL VENOUS BLD VENIPUNCTURE: CPT

## 2024-06-25 PROCEDURE — 83036 HEMOGLOBIN GLYCOSYLATED A1C: CPT

## 2024-06-25 PROCEDURE — 80061 LIPID PANEL: CPT

## 2024-06-25 PROCEDURE — 80053 COMPREHEN METABOLIC PANEL: CPT

## 2024-07-19 ENCOUNTER — OFFICE VISIT (OUTPATIENT)
Dept: FAMILY MEDICINE CLINIC | Facility: CLINIC | Age: 49
End: 2024-07-19
Payer: COMMERCIAL

## 2024-07-19 DIAGNOSIS — E66.9 OBESITY (BMI 35.0-39.9 WITHOUT COMORBIDITY): ICD-10-CM

## 2024-07-19 DIAGNOSIS — M54.50 LUMBAR PAIN: Primary | ICD-10-CM

## 2024-07-19 DIAGNOSIS — R37 SEXUAL DYSFUNCTION: ICD-10-CM

## 2024-07-19 DIAGNOSIS — Z79.899 HIGH RISK MEDICATION USE: ICD-10-CM

## 2024-07-19 PROCEDURE — 80305 DRUG TEST PRSMV DIR OPT OBS: CPT

## 2024-07-19 PROCEDURE — 99214 OFFICE O/P EST MOD 30 MIN: CPT

## 2024-07-21 VITALS
HEART RATE: 79 BPM | DIASTOLIC BLOOD PRESSURE: 83 MMHG | HEIGHT: 69 IN | WEIGHT: 262.2 LBS | SYSTOLIC BLOOD PRESSURE: 116 MMHG | BODY MASS INDEX: 38.83 KG/M2

## 2024-07-21 RX ORDER — PHENTERMINE HYDROCHLORIDE 37.5 MG/1
37.5 CAPSULE ORAL EVERY MORNING
Start: 2024-07-21

## 2024-07-21 RX ORDER — SILDENAFIL 50 MG/1
50 TABLET, FILM COATED ORAL DAILY PRN
Start: 2024-07-21

## 2024-07-21 RX ORDER — MELOXICAM 7.5 MG/1
7.5 TABLET ORAL DAILY
Start: 2024-07-21

## 2024-07-21 NOTE — PROGRESS NOTES
Chief Complaint  Obesity, Back Pain, and Sexual Problem    SUBJECTIVE  Hardik Woo presents to [facility]    History of Present Illness  Patient is a 48 y.o male who presents today for six month follow up.     He had labs done in June that were unremarkable. LDL was back to normal.     He still takes meloxicam for his shoulder pain. He has been having new onset right lumbar pain for a few months, He has to travel a lot for work. He reports his last plane ride he had to lean forward to get some relief. Pain is followed by prolonged standing. He denies any known injury. He is not in any pain at the moment. Her denies any radiation of pain when it occurs. He has not had any imaging of his back done.     He inquires about restarting phentermine for obesity. He was on this last year and it worked well for him. He did not have any side effects. Diet and exercise have not helped him lose any weight.     He also repots concerns about sexual dysfunction. He states he is able to get an  erection but has premature ejaculation. This has been an issue for him for quite some time, He does have hypogonadism but stopped treatment for it. This has started to affect his relationship and he inquires about medication to help. He has no cardiac history. No CP, SOA, palpitations.             Past Medical History:   Diagnosis Date    Hypogonadism male 12/22/2019    [facility] toenail     Screening for colon cancer 2014      Family History   Problem Relation Age of Onset    Heart disease Mother     Diabetes Mother     Heart disease Father     Heart disease Maternal Grandfather     Other Other         liz neoplasn, malignant    Other Other         no family history of colorectal cancer      Past Surgical History:   Procedure Laterality Date    ANKLE OPEN REDUCTION INTERNAL FIXATION  2-20-21    COLONOSCOPY  2011,2020        Current Outpatient Medications:     meloxicam (MOBIC) 7.5 MG tablet, Take 1 tablet  "by mouth Daily., Disp: , Rfl:     multivitamin with minerals tablet tablet, Take 1 tablet by mouth Daily., Disp: , Rfl:     Omega-3 Fatty Acids (fish oil) 1000 MG capsule capsule, Take 1 capsule by mouth Daily With Breakfast., Disp: , Rfl:     phentermine 37.5 MG capsule, Take 1 capsule by mouth Every Morning., Disp: , Rfl:     sildenafil (Viagra) 50 MG tablet, Take 1 tablet by mouth Daily As Needed for Erectile Dysfunction., Disp: , Rfl:     vitamin D3 125 MCG (5000 UT) capsule capsule, Take 1 capsule by mouth Daily., Disp: 90 capsule, Rfl: 0    OBJECTIVE  Vital Signs:   /83   Pulse 79   Ht 175.3 cm (69\")   Wt 119 kg (262 lb 3.2 oz)   BMI 38.72 kg/m²    Estimated body mass index is 38.72 kg/m² as calculated from the following:    Height as of this encounter: 175.3 cm (69\").    Weight as of this encounter: 119 kg (262 lb 3.2 oz).     Wt Readings from Last 3 Encounters:   07/19/24 119 kg (262 lb 3.2 oz)   01/05/24 122 kg (269 lb 6.4 oz)   07/03/23 120 kg (263 lb 12.8 oz)     BP Readings from Last 3 Encounters:   07/19/24 116/83   01/05/24 134/69   07/03/23 106/73       Physical Exam  Vitals reviewed.   Constitutional:       General: He is not in acute distress.  HENT:      Head: Normocephalic and atraumatic.   Eyes:      Conjunctiva/sclera: Conjunctivae normal.   Cardiovascular:      Rate and Rhythm: Normal rate and regular rhythm.      Heart sounds: Normal heart sounds.   Pulmonary:      Effort: Pulmonary effort is normal.      Breath sounds: Normal breath sounds.   Musculoskeletal:      Cervical back: Normal range of motion.      Lumbar back: Tenderness present.        Back:    Skin:     General: Skin is warm and dry.   Neurological:      Mental Status: He is alert and oriented to person, place, and time.   Psychiatric:         Mood and Affect: Mood normal.         Behavior: Behavior normal.         Thought Content: Thought content normal.         Judgment: Judgment normal.          Result Review  "   Common labs          12/22/2023    06:45 3/22/2024    06:42 6/25/2024    06:42   Common Labs   Glucose 109   105    BUN 19   19    Creatinine 0.97   0.94    Sodium 137   140    Potassium 4.9   4.0    Chloride 105   108    Calcium 9.4   9.0    Albumin 3.7   3.9    Total Bilirubin 0.4   0.4    Alkaline Phosphatase 61   60    AST (SGOT) 42   24    ALT (SGPT) 63   35    WBC 6.18      Hemoglobin 14.6      Hematocrit 44.7      Platelets 217      Total Cholesterol 164  159  158    Triglycerides 49  67  76    HDL Cholesterol 50  52  49    LDL Cholesterol  104  94  94    Hemoglobin A1C 6.30  5.90  6.00    PSA  1.190         No Images in the past 120 days found..      The above data has been reviewed by TAMY Mooney 07/19/2024 13:26 EDT.          Patient Care Team:  Debora Coleman APRN as PCP - General (Nurse Practitioner)    Class 2 Severe Obesity (BMI >=35 and <=39.9). Obesity-related health conditions include the following: none. Obesity is unchanged. BMI is is above average; BMI management plan is completed. We discussed portion control, increasing exercise, and pharmacologic options including phentermine .       ASSESSMENT & PLAN    Diagnoses and all orders for this visit:    1. Lumbar pain (Primary)  -     meloxicam (MOBIC) 7.5 MG tablet; Take 1 tablet by mouth Daily.  -     XR Spine Lumbar 4+ View; Future    2. Obesity (BMI 35.0-39.9 without comorbidity)  -     phentermine 37.5 MG capsule; Take 1 capsule by mouth Every Morning.  -     POC 12 Panel Urine Drug Screen    3. Sexual dysfunction  -     sildenafil (Viagra) 50 MG tablet; Take 1 tablet by mouth Daily As Needed for Erectile Dysfunction.    4. High risk medication use  -     POC 12 Panel Urine Drug Screen         Tobacco Use: Low Risk  (7/21/2024)    Patient History     Smoking Tobacco Use: Never     Smokeless Tobacco Use: Never     Passive Exposure: Not on file       Follow Up     Return in about 1 month (around 8/19/2024) for weight  check.      Patient was given instructions and counseling regarding his condition or for health maintenance advice. Please see specific information pulled into the AVS if appropriate.   I have reviewed information obtained and documented by others and I have confirmed the accuracy of this documented note.    TAMY Mooney

## 2024-07-22 ENCOUNTER — TRANSCRIBE ORDERS (OUTPATIENT)
Dept: FAMILY MEDICINE CLINIC | Facility: CLINIC | Age: 49
End: 2024-07-22
Payer: COMMERCIAL

## 2024-07-22 ENCOUNTER — HOSPITAL ENCOUNTER (OUTPATIENT)
Dept: GENERAL RADIOLOGY | Facility: HOSPITAL | Age: 49
Discharge: HOME | End: 2024-07-22
Payer: COMMERCIAL

## 2024-07-22 DIAGNOSIS — M54.50 LUMBAR PAIN: ICD-10-CM

## 2024-07-22 PROCEDURE — 72110 X-RAY EXAM L-2 SPINE 4/>VWS: CPT

## 2024-07-23 DIAGNOSIS — M51.37 DEGENERATION OF INTERVERTEBRAL DISC AT L5-S1 LEVEL: ICD-10-CM

## 2024-07-23 DIAGNOSIS — M51.36 DDD (DEGENERATIVE DISC DISEASE), LUMBAR: Primary | ICD-10-CM

## 2024-07-23 LAB
AMPHET+METHAMPHET UR QL: NEGATIVE
AMPHETAMINE INTERNAL CONTROL: NORMAL
AMPHETAMINES UR QL: NEGATIVE
BARBITURATE INTERNAL CONTROL: NORMAL
BARBITURATES UR QL SCN: NEGATIVE
BENZODIAZ UR QL SCN: NEGATIVE
BENZODIAZEPINE INTERNAL CONTROL: NORMAL
BUPRENORPHINE INTERNAL CONTROL: NORMAL
BUPRENORPHINE SERPL-MCNC: NEGATIVE NG/ML
CANNABINOIDS SERPL QL: NEGATIVE
COCAINE INTERNAL CONTROL: NORMAL
COCAINE UR QL: NEGATIVE
EXPIRATION DATE: NORMAL
Lab: NORMAL
MDMA (ECSTASY) INTERNAL CONTROL: NORMAL
MDMA UR QL SCN: NEGATIVE
METHADONE INTERNAL CONTROL: NORMAL
METHADONE UR QL SCN: NEGATIVE
METHAMPHETAMINE INTERNAL CONTROL: NORMAL
MORPHINE INTERNAL CONTROL: NORMAL
MORPHINE/OPIATES SCREEN, URINE: NEGATIVE
OXYCODONE INTERNAL CONTROL: NORMAL
OXYCODONE UR QL SCN: NEGATIVE
PCP UR QL SCN: NEGATIVE
PHENCYCLIDINE INTERNAL CONTROL: NORMAL
THC INTERNAL CONTROL: NORMAL

## 2024-08-15 ENCOUNTER — OFFICE VISIT (OUTPATIENT)
Dept: FAMILY MEDICINE CLINIC | Facility: CLINIC | Age: 49
End: 2024-08-15
Payer: COMMERCIAL

## 2024-08-15 VITALS
DIASTOLIC BLOOD PRESSURE: 74 MMHG | HEART RATE: 68 BPM | WEIGHT: 256 LBS | HEIGHT: 69 IN | OXYGEN SATURATION: 98 % | BODY MASS INDEX: 37.92 KG/M2 | SYSTOLIC BLOOD PRESSURE: 110 MMHG

## 2024-08-15 DIAGNOSIS — E66.9 OBESITY (BMI 35.0-39.9 WITHOUT COMORBIDITY): ICD-10-CM

## 2024-08-15 PROCEDURE — 99213 OFFICE O/P EST LOW 20 MIN: CPT

## 2024-08-15 RX ORDER — PHENTERMINE HYDROCHLORIDE 37.5 MG/1
37.5 CAPSULE ORAL EVERY MORNING
Qty: 30 CAPSULE | Refills: 0 | Status: SHIPPED | OUTPATIENT
Start: 2024-08-15

## 2024-08-15 NOTE — PROGRESS NOTES
"Chief Complaint  Weight Check and Obesity    SUBJECTIVE  Hardik Woo presents to [facility]    History of Present Illness  Patient is 48-year-old male  who presents today for 1 month follow-up on phentermine. Pt has lost 6 lbs, weighing in office today 256 lb. Pt states that he is doing well on all medications. He has been dieting and exercising as well. He wishes to continue at this time.     Past Medical History:   Diagnosis Date    Hypogonadism male 12/22/2019    Ingrown toenail     Screening for colon cancer 2014      Family History   Problem Relation Age of Onset    Heart disease Mother     Diabetes Mother     Heart disease Father     Heart disease Maternal Grandfather     Other Other         liz neoplasn, malignant    Other Other         no family history of colorectal cancer      Past Surgical History:   Procedure Laterality Date    ANKLE OPEN REDUCTION INTERNAL FIXATION  2-20-21    COLONOSCOPY  2011,2020        Current Outpatient Medications:     meloxicam (MOBIC) 7.5 MG tablet, Take 1 tablet by mouth Daily., Disp: , Rfl:     multivitamin with minerals tablet tablet, Take 1 tablet by mouth Daily., Disp: , Rfl:     Omega-3 Fatty Acids (fish oil) 1000 MG capsule capsule, Take 1 capsule by mouth Daily With Breakfast., Disp: , Rfl:     phentermine 37.5 MG capsule, Take 1 capsule by mouth Every Morning., Disp: 30 capsule, Rfl: 0    sildenafil (Viagra) 50 MG tablet, Take 1 tablet by mouth Daily As Needed for Erectile Dysfunction., Disp: , Rfl:     vitamin D3 125 MCG (5000 UT) capsule capsule, Take 1 capsule by mouth Daily., Disp: 90 capsule, Rfl: 0    OBJECTIVE  Vital Signs:   /74 (BP Location: Left arm, Patient Position: Sitting, Cuff Size: Large Adult)   Pulse 68   Ht 175.3 cm (69\")   Wt 116 kg (256 lb)   SpO2 98%   BMI 37.80 kg/m²    Estimated body mass index is 37.8 kg/m² as calculated from the following:    Height as of this encounter: 175.3 cm (69\").    " Weight as of this encounter: 116 kg (256 lb).     Wt Readings from Last 3 Encounters:   08/15/24 116 kg (256 lb)   07/19/24 119 kg (262 lb 3.2 oz)   01/05/24 122 kg (269 lb 6.4 oz)     BP Readings from Last 3 Encounters:   08/15/24 110/74   07/19/24 116/83   01/05/24 134/69       Physical Exam  Vitals reviewed.   Constitutional:       General: He is not in acute distress.     Appearance: He is not ill-appearing.   HENT:      Head: Normocephalic and atraumatic.   Eyes:      Conjunctiva/sclera: Conjunctivae normal.   Cardiovascular:      Rate and Rhythm: Normal rate and regular rhythm.      Heart sounds: Normal heart sounds.   Pulmonary:      Effort: Pulmonary effort is normal.      Breath sounds: Normal breath sounds.   Skin:     General: Skin is warm and dry.   Neurological:      Mental Status: He is alert and oriented to person, place, and time.   Psychiatric:         Mood and Affect: Mood normal.         Behavior: Behavior normal.         Thought Content: Thought content normal.         Judgment: Judgment normal.          Result Review    Common labs          12/22/2023    06:45 3/22/2024    06:42 6/25/2024    06:42   Common Labs   Glucose 109   105    BUN 19   19    Creatinine 0.97   0.94    Sodium 137   140    Potassium 4.9   4.0    Chloride 105   108    Calcium 9.4   9.0    Albumin 3.7   3.9    Total Bilirubin 0.4   0.4    Alkaline Phosphatase 61   60    AST (SGOT) 42   24    ALT (SGPT) 63   35    WBC 6.18      Hemoglobin 14.6      Hematocrit 44.7      Platelets 217      Total Cholesterol 164  159  158    Triglycerides 49  67  76    HDL Cholesterol 50  52  49    LDL Cholesterol  104  94  94    Hemoglobin A1C 6.30  5.90  6.00    PSA  1.190         XR Spine Lumbar Complete 4+VW    Result Date: 7/22/2024  Degenerative disc disease at L4-5 and L5-S1, otherwise negative. Electronically Signed: Wolfgang Allen MD  7/22/2024 10:59 PM EDT  Workstation ID: [id number]        The above data has been reviewed by Debora  TAMY Coleman 08/15/2024 08:13 EDT.          Patient Care Team:  Debora Colemna APRN as PCP - General (Nurse Practitioner)            ASSESSMENT & PLAN    Diagnoses and all orders for this visit:    1. Obesity (BMI 35.0-39.9 without comorbidity)  Comments:  doing well on phentermien 37.5 mg, continue, refill sent, UBALDO reviewed, UDS and CC UTD  Orders:  -     phentermine 37.5 MG capsule; Take 1 capsule by mouth Every Morning.  Dispense: 30 capsule; Refill: 0         Tobacco Use: Low Risk  (8/15/2024)    Patient History     Smoking Tobacco Use: Never     Smokeless Tobacco Use: Never     Passive Exposure: Not on file       Follow Up     Return in about 1 month (around 9/15/2024) for weight check.      Patient was given instructions and counseling regarding his condition or for health maintenance advice. Please see specific information pulled into the AVS if appropriate.   I have reviewed information obtained and documented by others and I have confirmed the accuracy of this documented note.    TAMY Mooney

## 2024-08-27 ENCOUNTER — OFFICE VISIT (OUTPATIENT)
Dept: NEUROSURGERY | Facility: CLINIC | Age: 49
End: 2024-08-27
Payer: COMMERCIAL

## 2024-08-27 VITALS
HEART RATE: 76 BPM | HEIGHT: 69 IN | BODY MASS INDEX: 37.92 KG/M2 | SYSTOLIC BLOOD PRESSURE: 106 MMHG | DIASTOLIC BLOOD PRESSURE: 62 MMHG | WEIGHT: 256 LBS

## 2024-08-27 DIAGNOSIS — M51.36 DEGENERATIVE DISC DISEASE, LUMBAR: ICD-10-CM

## 2024-08-27 DIAGNOSIS — M54.41 CHRONIC MIDLINE LOW BACK PAIN WITH RIGHT-SIDED SCIATICA: Primary | ICD-10-CM

## 2024-08-27 DIAGNOSIS — G89.29 CHRONIC MIDLINE LOW BACK PAIN WITH RIGHT-SIDED SCIATICA: Primary | ICD-10-CM

## 2024-08-27 PROCEDURE — 99214 OFFICE O/P EST MOD 30 MIN: CPT | Performed by: NURSE PRACTITIONER

## 2024-08-27 RX ORDER — VITAMIN B COMPLEX
100 TABLET ORAL DAILY
COMMUNITY

## 2024-08-27 RX ORDER — GINSENG 100 MG
50 CAPSULE ORAL DAILY
COMMUNITY

## 2024-08-27 NOTE — PROGRESS NOTES
Chief Complaint  Back Pain (Lower right sided back pain )    Subjective          Hardik Woo who is a 48 y.o. year old male who presents to [facility] for evaluation of lumbar spine.    History of Present Illness  The patient is a 48-year-old male presenting today for low back pain.    He first experienced the pain in either September or October of 2023, during a trade show where he was standing for approximately 8 hours. Despite wearing new shoes that alleviated his usual foot discomfort, he began to experience lower back pain after about 5 or 6 hours. This pain, described as a tightening sensation, would subside after lying down for 30 to 45 minutes.    He noticed similar symptoms earlier this year and again in July 2024, particularly when walking extensively in [facility]. The pain is not constant, but when it occurs, it is severe enough to limit his mobility, making it difficult for him to roll over or get out of bed. He describes the pain as so intense that it feels as if it could paralyze him if he took another step. However, such severe episodes have only occurred once or twice. On a daily basis, he experiences minimal pain, but it becomes significantly worse during flare-ups. He recalls an incident last week in [place] where he had to sit down due to the pain.    The pain is localized to the right side of his lower back and does not radiate to the leg, hip, or buttock. He occasionally takes ibuprofen for the pain and uses a heat pad on his chair at home. He reports no muscle cramps or spasms in his back and has not undergone any physical therapy for his back. He also reports no weakness in his legs.    He has been prescribed meloxicam for arthritis in his shoulder. He sustained a fracture in his left leg 3 years ago, which required the insertion of rods and plates.    SOCIAL HISTORY  The patient works in sales.     Was this the result of an injury or  "accident? : No    History of Previous Spinal Surgery?: No    Nicotine use non-smoker    BMI: Body mass index is 37.8 kg/m².      Review of Systems   Musculoskeletal:  Positive for arthralgias, back pain and myalgias.   All other systems reviewed and are negative.       Objective   Vital Signs:   /62   Pulse 76   Ht 175.3 cm (69\")   Wt 116 kg (256 lb)   BMI 37.80 kg/m²       Physical Exam  Vitals reviewed.   Constitutional:       Appearance: Normal appearance.   Musculoskeletal:      Lumbar back: No tenderness. Negative right straight leg raise test and negative left straight leg raise test.      Right hip: No tenderness. Normal range of motion.      Left hip: No tenderness. Normal range of motion.   Neurological:      Mental Status: He is alert and oriented to person, place, and time.      Motor: Motor strength is normal.     Gait: Gait is intact.      Deep Tendon Reflexes:      Reflex Scores:       Patellar reflexes are 2+ on the right side and 2+ on the left side.       Achilles reflexes are 2+ on the right side and 2+ on the left side.       Neurologic Exam     Mental Status   Oriented to person, place, and time.   Level of consciousness: alert    Motor Exam   Muscle bulk: normal  Overall muscle tone: normal    Strength   Strength 5/5 throughout.     Sensory Exam   Light touch normal.     Gait, Coordination, and Reflexes     Gait  Gait: normal    Reflexes   Right patellar: 2+  Left patellar: 2+  Right achilles: 2+  Left achilles: 2+  Right ankle clonus: absent  Left ankle clonus: absent       Physical Exam         Result Review :       Data reviewed : Radiologic studies XR Lumbar Spine on 7/22/24 at [facility] personally reviewed and interpreted. DDD at L4/5 and L5/S1.            Assessment and Plan    Diagnoses and all orders for this visit:    1. Chronic midline low back pain with right-sided sciatica (Primary)    2. Degenerative disc disease, lumbar        Assessment & Plan  1. Low back pain.  His x-ray " reveals mild disc degeneration in the lower lumbar spine, without significant arthritis or other abnormalities. The pain is more pronounced on the right side, suggesting possible disc bulging or joint irritation. Immediate intervention such as an MRI, injections, or surgery is not deemed necessary at this point due to the intermittent nature of the pain. He was advised to engage in light exercises and stretches to maintain flexibility and strengthen his core, which could help support his back and reduce the frequency of flare-ups. He was cautioned against heavy lifting and jarring activities that could exacerbate his condition. Over-the-counter anti-inflammatories such as ibuprofen or naproxen were recommended for use during periods of increased activity. He was provided with a set of home exercises and directed to the [facility] website for additional stretching and exercise resources. If he experiences another flare-up or worsening symptoms, particularly leg pain indicative of nerve impingement due to disc bulging, an MRI would be considered for a more detailed examination. If his current pain symptoms are not severe, physical therapy may not be necessary. However, if his condition worsens and the prescribed exercises do not provide relief, physical therapy could be initiated.           Follow Up   Return if symptoms worsen or fail to improve.  Patient was given instructions and counseling regarding his condition or for health maintenance advice.     Patient or patient representative verbalized consent for the use of Ambient Listening during the visit with  TAMY Corral for chart documentation. 8/27/2024  14:56 EDT    -Stretches and exercises at home  -Follow up as needed

## 2024-08-28 ENCOUNTER — PATIENT ROUNDING (BHMG ONLY) (OUTPATIENT)
Dept: NEUROSURGERY | Facility: CLINIC | Age: 49
End: 2024-08-28
Payer: COMMERCIAL

## 2024-09-19 ENCOUNTER — TELEMEDICINE (OUTPATIENT)
Dept: FAMILY MEDICINE CLINIC | Facility: CLINIC | Age: 49
End: 2024-09-19
Payer: COMMERCIAL

## 2024-09-19 VITALS
WEIGHT: 251 LBS | BODY MASS INDEX: 37.18 KG/M2 | SYSTOLIC BLOOD PRESSURE: 118 MMHG | DIASTOLIC BLOOD PRESSURE: 72 MMHG | HEART RATE: 72 BPM | HEIGHT: 69 IN

## 2024-09-19 DIAGNOSIS — E66.9 OBESITY (BMI 35.0-39.9 WITHOUT COMORBIDITY): ICD-10-CM

## 2024-09-19 PROCEDURE — 99213 OFFICE O/P EST LOW 20 MIN: CPT

## 2024-09-19 RX ORDER — PHENTERMINE HYDROCHLORIDE 37.5 MG/1
37.5 CAPSULE ORAL EVERY MORNING
Qty: 30 CAPSULE | Refills: 0 | Status: SHIPPED | OUTPATIENT
Start: 2024-09-19

## 2024-10-18 ENCOUNTER — TELEMEDICINE (OUTPATIENT)
Dept: FAMILY MEDICINE CLINIC | Facility: CLINIC | Age: 49
End: 2024-10-18
Payer: COMMERCIAL

## 2024-10-18 VITALS
WEIGHT: 250 LBS | HEIGHT: 69 IN | BODY MASS INDEX: 37.03 KG/M2 | SYSTOLIC BLOOD PRESSURE: 117 MMHG | DIASTOLIC BLOOD PRESSURE: 71 MMHG | HEART RATE: 74 BPM

## 2024-10-18 DIAGNOSIS — R73.03 PREDIABETES: ICD-10-CM

## 2024-10-18 DIAGNOSIS — E66.9 OBESITY (BMI 35.0-39.9 WITHOUT COMORBIDITY): Primary | ICD-10-CM

## 2024-10-18 PROCEDURE — 99214 OFFICE O/P EST MOD 30 MIN: CPT

## 2024-10-18 RX ORDER — PHENTERMINE HYDROCHLORIDE 37.5 MG/1
37.5 CAPSULE ORAL EVERY MORNING
Qty: 30 CAPSULE | Refills: 0 | Status: SHIPPED | OUTPATIENT
Start: 2024-10-18

## 2024-10-18 NOTE — PROGRESS NOTES
Chief Complaint  No chief complaint on file.    SUBJECTIVE  Hardik Woo presents to [facility]    You have chosen to receive care through a telehealth visit.  Do you consent to use a video/audio connection for your medical care today? Yes    Provider location- office- [address] [place] [address] [address] [place] [id number]    Patient location- [place], [place]    History of Present Illness  Patient presents today via telehealth for follow-up on weight loss medication.  Currently on phentermine. Since last appt they have lost 1lb. He has been traveling and not eating as well but still running.  Denies any adverse effects of medication . This is fill number 4. Wishes to continue with medication at this time.       He would also like A1c repeated for prediabetes. Last check was in June.     Past Medical History:   Diagnosis Date    Hypogonadism male 12/22/2019    Ingrown toenail     Screening for colon cancer 2014      Family History   Problem Relation Age of Onset    Heart disease Mother     Diabetes Mother     Heart disease Father     Heart disease Maternal Grandfather     Other Other         liz neoplasn, malignant    Other Other         no family history of colorectal cancer      Past Surgical History:   Procedure Laterality Date    ANKLE OPEN REDUCTION INTERNAL FIXATION  2-20-21    COLONOSCOPY  2011,2020        Current Outpatient Medications:     phentermine 37.5 MG capsule, Take 1 capsule by mouth Every Morning., Disp: 30 capsule, Rfl: 0    Coenzyme Q10 (CoQ10) 100 MG capsule, Take 1 capsule by mouth Daily., Disp: , Rfl:     meloxicam (MOBIC) 7.5 MG tablet, Take 1 tablet by mouth Daily., Disp: , Rfl:     multivitamin with minerals tablet tablet, Take 1 tablet by mouth Daily., Disp: , Rfl:     Omega-3 Fatty Acids (fish oil) 1000 MG capsule capsule, Take 1 capsule by mouth Daily With Breakfast. (Patient not taking: Reported on 8/27/2024), Disp: , Rfl:     sildenafil (Viagra) 50 MG  "tablet, Take 1 tablet by mouth Daily As Needed for Erectile Dysfunction., Disp: , Rfl:     vitamin D3 125 MCG (5000 UT) capsule capsule, Take 1 capsule by mouth Daily., Disp: 90 capsule, Rfl: 0    Zinc 50 MG tablet, Take 1 tablet by mouth Daily., Disp: , Rfl:     OBJECTIVE  Vital Signs:   /71   Pulse 74   Ht 175.3 cm (69\")   Wt 113 kg (250 lb)   BMI 36.92 kg/m²    Estimated body mass index is 36.92 kg/m² as calculated from the following:    Height as of this encounter: 175.3 cm (69\").    Weight as of this encounter: 113 kg (250 lb).     Wt Readings from Last 3 Encounters:   10/18/24 113 kg (250 lb)   09/19/24 114 kg (251 lb)   08/27/24 116 kg (256 lb)     BP Readings from Last 3 Encounters:   10/18/24 117/71   09/19/24 118/72   08/27/24 106/62       Physical Exam     Result Review    Common labs          12/22/2023    06:45 3/22/2024    06:42 6/25/2024    06:42   Common Labs   Glucose 109   105    BUN 19   19    Creatinine 0.97   0.94    Sodium 137   140    Potassium 4.9   4.0    Chloride 105   108    Calcium 9.4   9.0    Albumin 3.7   3.9    Total Bilirubin 0.4   0.4    Alkaline Phosphatase 61   60    AST (SGOT) 42   24    ALT (SGPT) 63   35    WBC 6.18      Hemoglobin 14.6      Hematocrit 44.7      Platelets 217      Total Cholesterol 164  159  158    Triglycerides 49  67  76    HDL Cholesterol 50  52  49    LDL Cholesterol  104  94  94    Hemoglobin A1C 6.30  5.90  6.00    PSA  1.190         XR Spine Lumbar Complete 4+VW    Result Date: 7/22/2024  Degenerative disc disease at L4-5 and L5-S1, otherwise negative. Electronically Signed: Wolfgang Allen MD  7/22/2024 10:59 PM EDT  Workstation ID: [id number]        The above data has been reviewed by TAMY Mooney 10/18/2024 08:28 EDT.          Patient Care Team:  Debora Coleman APRN as PCP - General (Nurse Practitioner)            ASSESSMENT & PLAN    Diagnoses and all orders for this visit:    1. Obesity (BMI 35.0-39.9 without comorbidity) " (Primary)  Comments:  doing well on phentermien 37.5 mg, continue, refill sent, UBALDO reviewed, UDS and CC UTD  Orders:  -     phentermine 37.5 MG capsule; Take 1 capsule by mouth Every Morning.  Dispense: 30 capsule; Refill: 0    2. Prediabetes  Comments:  repeat a1c, work on diet and exercise  Orders:  -     Hemoglobin A1c; Future         Tobacco Use: Low Risk  (10/18/2024)    Patient History     Smoking Tobacco Use: Never     Smokeless Tobacco Use: Never     Passive Exposure: Not on file       Follow Up     Return in about 1 month (around 11/18/2024) for Next scheduled follow up.      Patient was given instructions and counseling regarding his condition or for health maintenance advice. Please see specific information pulled into the AVS if appropriate.   I have reviewed information obtained and documented by others and I have confirmed the accuracy of this documented note.    TAMY Mooney

## 2024-10-23 ENCOUNTER — LAB (OUTPATIENT)
Dept: LAB | Facility: HOSPITAL | Age: 49
End: 2024-10-23
Payer: COMMERCIAL

## 2024-10-23 DIAGNOSIS — R73.03 PREDIABETES: ICD-10-CM

## 2024-10-23 LAB — HBA1C MFR BLD: 5.7 % (ref 4.8–5.6)

## 2024-10-23 PROCEDURE — 36415 COLL VENOUS BLD VENIPUNCTURE: CPT

## 2024-10-23 PROCEDURE — 83036 HEMOGLOBIN GLYCOSYLATED A1C: CPT

## 2024-10-31 ENCOUNTER — TELEPHONE (OUTPATIENT)
Dept: FAMILY MEDICINE CLINIC | Facility: CLINIC | Age: 49
End: 2024-10-31
Payer: COMMERCIAL

## 2024-10-31 NOTE — TELEPHONE ENCOUNTER
HUB TO RELAY & SCHEDULE:     Called patient to reschedule appt due to provider no longer accepting weight lost MyChart visit visit.  LVM

## 2025-01-13 DIAGNOSIS — M54.50 LUMBAR PAIN: ICD-10-CM

## 2025-01-14 RX ORDER — MELOXICAM 7.5 MG/1
7.5 TABLET ORAL DAILY
Qty: 90 TABLET | Refills: 1 | Status: SHIPPED | OUTPATIENT
Start: 2025-01-14

## 2025-01-16 ENCOUNTER — OFFICE VISIT (OUTPATIENT)
Dept: INTERNAL MEDICINE | Age: 50
End: 2025-01-16
Payer: COMMERCIAL

## 2025-01-16 VITALS
WEIGHT: 267 LBS | RESPIRATION RATE: 18 BRPM | TEMPERATURE: 98.6 F | OXYGEN SATURATION: 98 % | HEART RATE: 68 BPM | SYSTOLIC BLOOD PRESSURE: 122 MMHG | BODY MASS INDEX: 39.55 KG/M2 | HEIGHT: 69 IN | DIASTOLIC BLOOD PRESSURE: 70 MMHG

## 2025-01-16 DIAGNOSIS — E29.1 HYPOGONADISM, MALE: ICD-10-CM

## 2025-01-16 DIAGNOSIS — Z12.5 SCREENING FOR PROSTATE CANCER: ICD-10-CM

## 2025-01-16 DIAGNOSIS — E55.9 VITAMIN D DEFICIENCY: ICD-10-CM

## 2025-01-16 DIAGNOSIS — Z76.89 ENCOUNTER TO ESTABLISH CARE: Primary | ICD-10-CM

## 2025-01-16 DIAGNOSIS — M19.012 PRIMARY OSTEOARTHRITIS OF LEFT SHOULDER: ICD-10-CM

## 2025-01-16 DIAGNOSIS — L91.8 MULTIPLE ACQUIRED SKIN TAGS: ICD-10-CM

## 2025-01-16 DIAGNOSIS — R73.03 PREDIABETES: ICD-10-CM

## 2025-01-16 DIAGNOSIS — Z00.00 ANNUAL PHYSICAL EXAM: ICD-10-CM

## 2025-01-16 PROCEDURE — 99214 OFFICE O/P EST MOD 30 MIN: CPT

## 2025-01-16 NOTE — PROGRESS NOTES
Chief Complaint  Establish Care (49 year old male here today to establish care. He denies any specific issues or concerns at this time. )    History of Present Illness  SUBJECTIVE  Hardik Woo presents to [facility]     History of Present Illness  The patient is a 49-year-old male who presents to establish care.    He has been managing his low testosterone levels with injections, which he discontinued due to an improvement in his condition. He has not monitored his testosterone levels recently.    He is currently on meloxicam for arthritis in his left shoulder, which he reports as beneficial. He attempted to discontinue the medication for a period of 2 weeks, during which he experienced severe shoulder pain. He also ceased weightlifting during this time and is uncertain if the pain was due to the medication discontinuation or the cessation of weightlifting. He has since resumed taking meloxicam. He had x-rays of his left shoulder demonstrating arthritis.     His blood work was last conducted in March 2024, and his A1c was checked in October 2024. He has been experiencing elevated blood sugar levels, which have now decreased slightly but remain in the prediabetic range.    He has a history of precancerous polyps, for which he underwent a colonoscopy in September 2020. He is due for another colonoscopy this year.    He has a family history of heart disease but reports that his blood pressure and cholesterol levels are within normal limits. He has been struggling with weight management and was previously on phentermine, which he discontinued in September 2024 or October 2024. He has gained 1 to 2 pounds, which he attributes to the holiday season. He maintains a regular exercise routine at [employer] and takes creatine supplements. He finds it challenging to maintain a healthy diet while traveling for work.    He sustained a leg injury in 2021, resulting in a vika placement. He  continues to experience swelling in his leg, which is 2.5 inches larger than his other calf. He has not yet scheduled a follow-up appointment for screw removal. He experiences occasional discomfort in his leg and has reduced his running speed from 6 to 7 miles per hour to 3.5 miles per hour for approximately 40 minutes.    He has not received the shingles vaccine but receives the influenza vaccine annually. He does not experience chest pain, shortness of breath, or irregular heartbeats.    He has noticed the development of skin tags and is considering a dermatology consultation. He spends a significant amount of time outdoors and does not use sunscreen.    Supplemental Information  He takes CoQ10, vitamin D, multivitamin, and Viagra as needed. He is unsure if he will continue taking Viagra as it has not been effective for him.    SOCIAL HISTORY  He does not smoke cigarettes but occasionally smokes cigars. He used to drink alcohol occasionally but has not had any for 4 months. He has 5 daughters.    FAMILY HISTORY  He reports a family history of heart disease. He reports no family history of colon cancer or prostate cancer.    MEDICATIONS  Current: CoQ10, meloxicam, vitamin D, multivitamin, creatine.  Discontinued: Viagra, phentermine.    IMMUNIZATIONS  He gets his influenza vaccine annually.      Past Medical History:   Diagnosis Date    Hypogonadism male 12/22/2019    Ingrown toenail     Obesity     Screening for colon cancer 2014      Family History   Problem Relation Age of Onset    Heart disease Mother     Diabetes Mother     Heart disease Father     Heart disease Maternal Grandfather     Other Other         liz neoplasn, malignant    Other Other         no family history of colorectal cancer      Past Surgical History:   Procedure Laterality Date    ANKLE OPEN REDUCTION INTERNAL FIXATION  2-20-21    COLONOSCOPY  2011,2020        Current Outpatient Medications:     Coenzyme Q10 (CoQ10) 100 MG capsule, Take 1  "capsule by mouth Daily., Disp: , Rfl:     meloxicam (MOBIC) 7.5 MG tablet, TAKE 1 TABLET BY MOUTH EVERY DAY, Disp: 90 tablet, Rfl: 1    multivitamin with minerals tablet tablet, Take 1 tablet by mouth Daily., Disp: , Rfl:     vitamin D3 125 MCG (5000 UT) capsule capsule, Take 1 capsule by mouth Daily., Disp: 90 capsule, Rfl: 0    Zinc 50 MG tablet, Take 1 tablet by mouth Daily., Disp: , Rfl:     OBJECTIVE  Vital Signs:   /70 (BP Location: Left arm, Patient Position: Sitting)   Pulse 68   Temp 98.6 °F (37 °C) (Temporal)   Resp 18   Ht 175.3 cm (69\")   Wt 121 kg (267 lb)   SpO2 98%   BMI 39.43 kg/m²    Estimated body mass index is 39.43 kg/m² as calculated from the following:    Height as of this encounter: 175.3 cm (69\").    Weight as of this encounter: 121 kg (267 lb).     Wt Readings from Last 3 Encounters:   01/16/25 121 kg (267 lb)   10/18/24 113 kg (250 lb)   09/19/24 114 kg (251 lb)     BP Readings from Last 3 Encounters:   01/16/25 122/70   10/18/24 117/71   09/19/24 118/72       Physical Exam  Vitals and nursing note reviewed.   Constitutional:       Appearance: Normal appearance.   HENT:      Head: Normocephalic.      Right Ear: Tympanic membrane normal.      Left Ear: Tympanic membrane normal.   Eyes:      Extraocular Movements: Extraocular movements intact.      Conjunctiva/sclera: Conjunctivae normal.   Cardiovascular:      Rate and Rhythm: Normal rate and regular rhythm.      Heart sounds: Normal heart sounds. No murmur heard.  Pulmonary:      Effort: Pulmonary effort is normal.      Breath sounds: Normal breath sounds. No wheezing or rales.   Abdominal:      General: Bowel sounds are normal.      Palpations: Abdomen is soft.      Tenderness: There is no abdominal tenderness. There is no guarding.   Musculoskeletal:         General: No swelling. Normal range of motion.      Cervical back: Normal range of motion and neck supple.   Skin:     General: Skin is warm and dry.   Neurological:      " General: No focal deficit present.      Mental Status: He is alert and oriented to person, place, and time. Mental status is at baseline.   Psychiatric:         Mood and Affect: Mood normal.         Behavior: Behavior normal.         Thought Content: Thought content normal.         Judgment: Judgment normal.          Result Review        No Images in the past 120 days found..     The above data has been reviewed by TAMY Mcdonald 01/16/2025 16:15 EST.          Patient Care Team:  Yue aYdav APRN as PCP - General (Internal Medicine)            ASSESSMENT & PLAN    Diagnoses and all orders for this visit:    1. Encounter to establish care (Primary)    2. Primary osteoarthritis of left shoulder  Assessment & Plan:  Improved with daily meloxicam      3. Prediabetes  -     CBC w AUTO Differential; Future  -     Comprehensive metabolic panel; Future  -     Lipid panel; Future  -     TSH+Free T4; Future  -     Hemoglobin A1c; Future    4. Hypogonadism, male  -     Testosterone, Total, Women, Children, and Hypogonadal Males; Future    5. Vitamin D deficiency  -     Vitamin D 25 hydroxy; Future    6. Annual physical exam  Comments:  utilized for lab purposes  Orders:  -     CBC w AUTO Differential; Future  -     Comprehensive metabolic panel; Future  -     Lipid panel; Future  -     TSH+Free T4; Future  -     Ambulatory Referral to Dermatology    7. Screening for prostate cancer  -     PSA SCREENING; Future    8. Multiple acquired skin tags  -     Ambulatory Referral to Dermatology         Assessment & Plan  1. Low testosterone.  He previously took testosterone shots but discontinued them as he felt better. No current treatment is planned. Will check labs.    2. Left shoulder arthritis.  He takes meloxicam for arthritis in his left shoulder. He experienced increased pain when he stopped taking it for 2 weeks. He is advised to continue taking meloxicam as it helps manage his symptoms. Kidney function is normal, so there  are no concerns with continued use.    3. Prediabetes.  His A1c levels are in the prediabetes range. He is advised to continue monitoring his blood sugar levels and maintain a healthy diet. Blood work will be ordered to monitor his A1c levels. If his A1c levels become uncontrolled, insurance will cover additional treatments.    4. History of precancerous polyps.  He had a colonoscopy in September 2020, which revealed precancerous polyps. He is advised to schedule another colonoscopy this year as per the recommended follow-up interval.    5. Weight management.  He has struggled with weight and was previously on phentermine, which he discontinued in September or October 2024. He has gained 1 to 2 pounds, which he attributes to the holiday season. He is advised to continue monitoring his diet and exercise regularly. He is also advised to consider other weight management options if his insurance coverage changes.    6. Leg injury.  He sustained a leg injury in 2021, resulting in a vika placement. He continues to experience swelling in his leg, which is 2.5 inches larger than his other calf. He has not yet scheduled a follow-up appointment for screw removal.    7. Health maintenance.  He is advised to get the shingles vaccine when he turns 53. He is also advised to continue getting his influenza vaccine annually. Blood work will be ordered to monitor his overall health.    8. Skin tags.  [facility] to [facility] will be made for further evaluation and management of his skin tags.    Follow-up  The patient will follow up in 3 months.    PROCEDURE  The patient underwent a colonoscopy in September 2020 due to a history of precancerous polyps.      Tobacco Use: Low Risk  (1/16/2025)    Patient History     Smoking Tobacco Use: Never     Smokeless Tobacco Use: Never     Passive Exposure: Not on file       Follow Up     Return in about 3 months (around 4/16/2025) for Annual physical.    Please note that portions  of this note were completed with a voice recognition program.    Patient was given instructions and counseling regarding his condition or for health maintenance advice. Please see specific information pulled into the AVS if appropriate.   I have reviewed information obtained and documented by others and I have confirmed the accuracy of this documented note.    TAMY Mcdonald    Patient or patient representative verbalized consent for the use of Ambient Listening during the visit with  TAMY Mcdonald for chart documentation. 1/16/2025  16:21 EST

## 2025-03-03 ENCOUNTER — OFFICE VISIT (OUTPATIENT)
Dept: INTERNAL MEDICINE | Age: 50
End: 2025-03-03
Payer: COMMERCIAL

## 2025-03-03 VITALS
WEIGHT: 268 LBS | OXYGEN SATURATION: 98 % | HEIGHT: 69 IN | TEMPERATURE: 97.9 F | SYSTOLIC BLOOD PRESSURE: 118 MMHG | BODY MASS INDEX: 39.69 KG/M2 | RESPIRATION RATE: 18 BRPM | DIASTOLIC BLOOD PRESSURE: 76 MMHG | HEART RATE: 64 BPM

## 2025-03-03 DIAGNOSIS — K62.5 BRBPR (BRIGHT RED BLOOD PER RECTUM): ICD-10-CM

## 2025-03-03 DIAGNOSIS — Z12.11 SCREENING FOR COLON CANCER: ICD-10-CM

## 2025-03-03 DIAGNOSIS — R53.83 OTHER FATIGUE: ICD-10-CM

## 2025-03-03 DIAGNOSIS — M19.012 PRIMARY OSTEOARTHRITIS OF LEFT SHOULDER: Primary | ICD-10-CM

## 2025-03-03 PROCEDURE — 99214 OFFICE O/P EST MOD 30 MIN: CPT

## 2025-03-03 RX ORDER — MELOXICAM 15 MG/1
15 TABLET ORAL DAILY
Qty: 30 TABLET | Refills: 2 | Status: SHIPPED | OUTPATIENT
Start: 2025-03-03 | End: 2025-03-03

## 2025-03-03 RX ORDER — METHYLPREDNISOLONE 4 MG/1
TABLET ORAL
Qty: 21 EACH | Refills: 0 | Status: SHIPPED | OUTPATIENT
Start: 2025-03-03 | End: 2025-03-08

## 2025-03-03 NOTE — PROGRESS NOTES
Chief Complaint  Shoulder Pain (49 year old male here today complaining of left shoulder pain for over a year and was diagnosed with Arthritis seen on Xray's. States the pain is always there but there are days where it is worse.  )    History of Present Illness  SUBJECTIVE  Hardik Woo presents to [facility] worsening left shoulder pain. He states that his shoulder pain has gotten worse over the last 6-8 weeks.   He tried to come off of the meloxicam and he started lifting weights; then pain returned. He has had chronic shoulder pain for years. He resumed the meloxicam and pain continues. He states that the pain is impeding his sleep. He does complain of a nagging pain with ROM. He does have limited ROM as well.     He does mention some occasional blood in his stool. It is bright red blood. This has happened a couple of times. He states he does have hemorrhoids.  He denies any nausea, vomiting, diarrhea.  He is about due for his colon cancer screening as well.      Past Medical History:   Diagnosis Date    Arthritis 2024    In shoulder    Colon polyp 10 years?    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 Have vika and pins    VASECTOMY  9/2000        Current Outpatient Medications:     Coenzyme Q10 (CoQ10) 100 MG capsule, Take 1 capsule by mouth Daily., Disp: , Rfl:     multivitamin with minerals tablet tablet, Take 1 tablet by  "mouth Daily., Disp: , Rfl:     Diclofenac Sodium (VOLTAREN) 1 % gel gel, Apply 4 g topically to the appropriate area as directed 4 (Four) Times a Day As Needed (left shoulder pain)., Disp: 350 g, Rfl: 1    methylPREDNISolone (MEDROL) 4 MG dose pack, Take as directed on package instructions., Disp: 21 each, Rfl: 0    vitamin D3 125 MCG (5000 UT) capsule capsule, Take 1 capsule by mouth Daily. (Patient not taking: Reported on 3/3/2025), Disp: 90 capsule, Rfl: 0    Zinc 50 MG tablet, Take 1 tablet by mouth Daily. (Patient not taking: Reported on 3/3/2025), Disp: , Rfl:     OBJECTIVE  Vital Signs:   /76 (BP Location: Left arm, Patient Position: Sitting)   Pulse 64   Temp 97.9 °F (36.6 °C) (Temporal)   Resp 18   Ht 175.3 cm (69\")   Wt 122 kg (268 lb)   SpO2 98%   BMI 39.58 kg/m²    Estimated body mass index is 39.58 kg/m² as calculated from the following:    Height as of this encounter: 175.3 cm (69\").    Weight as of this encounter: 122 kg (268 lb).     Wt Readings from Last 3 Encounters:   03/03/25 122 kg (268 lb)   01/16/25 121 kg (267 lb)   10/18/24 113 kg (250 lb)     BP Readings from Last 3 Encounters:   03/03/25 118/76   01/16/25 122/70   10/18/24 117/71       Physical Exam  Vitals and nursing note reviewed.   Constitutional:       Appearance: Normal appearance.   HENT:      Head: Normocephalic.   Eyes:      Extraocular Movements: Extraocular movements intact.      Conjunctiva/sclera: Conjunctivae normal.   Cardiovascular:      Rate and Rhythm: Normal rate and regular rhythm.      Heart sounds: Normal heart sounds. No murmur heard.  Pulmonary:      Effort: Pulmonary effort is normal.      Breath sounds: Normal breath sounds. No wheezing or rales.   Abdominal:      General: Bowel sounds are normal.      Palpations: Abdomen is soft.      Tenderness: There is no abdominal tenderness. There is no guarding.   Musculoskeletal:         General: No swelling. Normal range of motion.      Left shoulder: " Decreased range of motion.      Cervical back: Normal range of motion and neck supple.   Skin:     General: Skin is warm and dry.   Neurological:      General: No focal deficit present.      Mental Status: He is alert and oriented to person, place, and time. Mental status is at baseline.   Psychiatric:         Mood and Affect: Mood normal.         Behavior: Behavior normal.         Thought Content: Thought content normal.         Judgment: Judgment normal.          Result Review        No Images in the past 120 days found..     The above data has been reviewed by TAMY Mcdonald 03/03/2025 13:43 EST.          Patient Care Team:  Yue Yadav APRN as PCP - General (Internal Medicine)            ASSESSMENT & PLAN    Diagnoses and all orders for this visit:    1. Primary osteoarthritis of left shoulder (Primary)  -     MRI Shoulder Left Without Contrast; Future  -     Ambulatory Referral to Orthopedic Surgery  -     Discontinue: meloxicam (MOBIC) 15 MG tablet; Take 1 tablet by mouth Daily.  Dispense: 30 tablet; Refill: 2  -     Diclofenac Sodium (VOLTAREN) 1 % gel gel; Apply 4 g topically to the appropriate area as directed 4 (Four) Times a Day As Needed (left shoulder pain).  Dispense: 350 g; Refill: 1    2. Screening for colon cancer  -     Ambulatory Referral For Screening Colonoscopy    3. BRBPR (bright red blood per rectum)  Assessment & Plan:  X 2 occasions.  Patient does have hemorrhoids is unsure if it was from his hemorrhoids or not.  He is also taking meloxicam.  Will have him stop the meloxicam.  He will get a fecal occult for us and labs are ordered as well.  Also recommend that we set him up for his colon cancer screening.    Orders:  -     Occult Blood, Fecal By Immunoassay - Stool, Per Rectum; Future    4. Other fatigue  -     Vitamin B12; Future  -     Folate; Future    Other orders  -     methylPREDNISolone (MEDROL) 4 MG dose pack; Take as directed on package instructions.  Dispense: 21 each;  Refill: 0         Assessment & Plan        Tobacco Use: Low Risk  (3/3/2025)    Patient History     Smoking Tobacco Use: Never     Smokeless Tobacco Use: Never     Passive Exposure: Not on file       Follow Up     Return if symptoms worsen or fail to improve.    Please note that portions of this note were completed with a voice recognition program.    Patient was given instructions and counseling regarding his condition or for health maintenance advice. Please see specific information pulled into the AVS if appropriate.   I have reviewed information obtained and documented by others and I have confirmed the accuracy of this documented note.    TAMY Mcdonald    Patient or patient representative verbalized consent for the use of Ambient Listening during the visit with  TAMY Mcdonald for chart documentation. 3/3/2025  14:25 EST

## 2025-03-03 NOTE — ASSESSMENT & PLAN NOTE
X 2 occasions.  Patient does have hemorrhoids is unsure if it was from his hemorrhoids or not.  He is also taking meloxicam.  Will have him stop the meloxicam.  He will get a fecal occult for us and labs are ordered as well.  Also recommend that we set him up for his colon cancer screening.

## 2025-03-04 ENCOUNTER — TELEPHONE (OUTPATIENT)
Dept: GASTROENTEROLOGY | Facility: CLINIC | Age: 50
End: 2025-03-04
Payer: COMMERCIAL

## 2025-03-04 NOTE — TELEPHONE ENCOUNTER
Attempted to contact patient in regard to 5 yr colon recall referral-Left message on  for call back

## 2025-03-05 ENCOUNTER — TELEPHONE (OUTPATIENT)
Dept: GASTROENTEROLOGY | Facility: CLINIC | Age: 50
End: 2025-03-05
Payer: COMMERCIAL

## 2025-03-05 NOTE — TELEPHONE ENCOUNTER
Returned call to patient-Nurse/MA phone screening scheduled 7/7/25 @ 9:30 am    The patient is a 43y year old Male complaining of hematuria.

## 2025-03-05 NOTE — TELEPHONE ENCOUNTER
Hub staff attempted to follow warm transfer process and was unsuccessful     Caller: Hadrik Woo    Relationship to patient: Self    Best call back number: [phone]    Patient is needing: PATIENT WOULD LIKE TO SCHEDULE COLONOSCOPY FROM RECALL. PLEASE CALL BACK ANYTIME, OKAY TO LEAVE VM.

## 2025-03-07 ENCOUNTER — LAB (OUTPATIENT)
Dept: LAB | Facility: HOSPITAL | Age: 50
End: 2025-03-07
Payer: COMMERCIAL

## 2025-03-07 DIAGNOSIS — K62.5 BRBPR (BRIGHT RED BLOOD PER RECTUM): ICD-10-CM

## 2025-03-07 LAB — HEMOCCULT STL QL IA: NEGATIVE

## 2025-03-07 PROCEDURE — 82274 ASSAY TEST FOR BLOOD FECAL: CPT

## 2025-03-13 ENCOUNTER — CLINICAL SUPPORT (OUTPATIENT)
Dept: INTERNAL MEDICINE | Age: 50
End: 2025-03-13
Payer: COMMERCIAL

## 2025-03-13 DIAGNOSIS — R53.83 OTHER FATIGUE: ICD-10-CM

## 2025-03-13 DIAGNOSIS — Z00.00 ANNUAL PHYSICAL EXAM: ICD-10-CM

## 2025-03-13 DIAGNOSIS — E55.9 VITAMIN D DEFICIENCY: ICD-10-CM

## 2025-03-13 DIAGNOSIS — R73.03 PREDIABETES: ICD-10-CM

## 2025-03-13 DIAGNOSIS — E29.1 HYPOGONADISM, MALE: ICD-10-CM

## 2025-03-13 LAB
25(OH)D3 SERPL-MCNC: 57.1 NG/ML (ref 30–100)
ALBUMIN SERPL-MCNC: 3.5 G/DL (ref 3.5–5.2)
ALBUMIN/GLOB SERPL: 0.9 G/DL
ALP SERPL-CCNC: 62 U/L (ref 39–117)
ALT SERPL W P-5'-P-CCNC: 33 U/L (ref 1–41)
ANION GAP SERPL CALCULATED.3IONS-SCNC: 9.3 MMOL/L (ref 5–15)
AST SERPL-CCNC: 33 U/L (ref 1–40)
BASOPHILS # BLD AUTO: 0.07 10*3/MM3 (ref 0–0.2)
BASOPHILS NFR BLD AUTO: 0.9 % (ref 0–1.5)
BILIRUB SERPL-MCNC: 0.7 MG/DL (ref 0–1.2)
BUN SERPL-MCNC: 19 MG/DL (ref 6–20)
BUN/CREAT SERPL: 13.2 (ref 7–25)
CALCIUM SPEC-SCNC: 9.7 MG/DL (ref 8.6–10.5)
CHLORIDE SERPL-SCNC: 106 MMOL/L (ref 98–107)
CHOLEST SERPL-MCNC: 167 MG/DL (ref 0–200)
CO2 SERPL-SCNC: 25.7 MMOL/L (ref 22–29)
CREAT SERPL-MCNC: 1.44 MG/DL (ref 0.76–1.27)
DEPRECATED RDW RBC AUTO: 42.8 FL (ref 37–54)
EGFRCR SERPLBLD CKD-EPI 2021: 58.8 ML/MIN/1.73
EOSINOPHIL # BLD AUTO: 0.27 10*3/MM3 (ref 0–0.4)
EOSINOPHIL NFR BLD AUTO: 3.5 % (ref 0.3–6.2)
ERYTHROCYTE [DISTWIDTH] IN BLOOD BY AUTOMATED COUNT: 13.2 % (ref 12.3–15.4)
FOLATE SERPL-MCNC: >20 NG/ML (ref 4.78–24.2)
GLOBULIN UR ELPH-MCNC: 3.7 GM/DL
GLUCOSE SERPL-MCNC: 88 MG/DL (ref 65–99)
HBA1C MFR BLD: 5.8 % (ref 4.8–5.6)
HCT VFR BLD AUTO: 46.8 % (ref 37.5–51)
HDLC SERPL-MCNC: 58 MG/DL (ref 40–60)
HGB BLD-MCNC: 15.3 G/DL (ref 13–17.7)
IMM GRANULOCYTES # BLD AUTO: 0.02 10*3/MM3 (ref 0–0.05)
IMM GRANULOCYTES NFR BLD AUTO: 0.3 % (ref 0–0.5)
LDLC SERPL CALC-MCNC: 100 MG/DL (ref 0–100)
LDLC/HDLC SERPL: 1.73 {RATIO}
LYMPHOCYTES # BLD AUTO: 2.71 10*3/MM3 (ref 0.7–3.1)
LYMPHOCYTES NFR BLD AUTO: 35.4 % (ref 19.6–45.3)
MCH RBC QN AUTO: 28.9 PG (ref 26.6–33)
MCHC RBC AUTO-ENTMCNC: 32.7 G/DL (ref 31.5–35.7)
MCV RBC AUTO: 88.5 FL (ref 79–97)
MONOCYTES # BLD AUTO: 0.64 10*3/MM3 (ref 0.1–0.9)
MONOCYTES NFR BLD AUTO: 8.4 % (ref 5–12)
NEUTROPHILS NFR BLD AUTO: 3.95 10*3/MM3 (ref 1.7–7)
NEUTROPHILS NFR BLD AUTO: 51.5 % (ref 42.7–76)
NRBC BLD AUTO-RTO: 0 /100 WBC (ref 0–0.2)
PLATELET # BLD AUTO: 221 10*3/MM3 (ref 140–450)
PMV BLD AUTO: 10 FL (ref 6–12)
POTASSIUM SERPL-SCNC: 5 MMOL/L (ref 3.5–5.2)
PROT SERPL-MCNC: 7.2 G/DL (ref 6–8.5)
RBC # BLD AUTO: 5.29 10*6/MM3 (ref 4.14–5.8)
SODIUM SERPL-SCNC: 141 MMOL/L (ref 136–145)
T4 FREE SERPL-MCNC: 1.42 NG/DL (ref 0.92–1.68)
TRIGL SERPL-MCNC: 43 MG/DL (ref 0–150)
TSH SERPL DL<=0.05 MIU/L-ACNC: 2.91 UIU/ML (ref 0.27–4.2)
VIT B12 BLD-MCNC: 503 PG/ML (ref 211–946)
VLDLC SERPL-MCNC: 9 MG/DL (ref 5–40)
WBC NRBC COR # BLD AUTO: 7.66 10*3/MM3 (ref 3.4–10.8)

## 2025-03-13 PROCEDURE — 82306 VITAMIN D 25 HYDROXY: CPT

## 2025-03-13 PROCEDURE — 82746 ASSAY OF FOLIC ACID SERUM: CPT

## 2025-03-13 PROCEDURE — 36415 COLL VENOUS BLD VENIPUNCTURE: CPT

## 2025-03-13 PROCEDURE — 80061 LIPID PANEL: CPT

## 2025-03-13 PROCEDURE — 80050 GENERAL HEALTH PANEL: CPT

## 2025-03-13 PROCEDURE — 82607 VITAMIN B-12: CPT

## 2025-03-13 PROCEDURE — 84403 ASSAY OF TOTAL TESTOSTERONE: CPT

## 2025-03-13 PROCEDURE — 83036 HEMOGLOBIN GLYCOSYLATED A1C: CPT

## 2025-03-13 PROCEDURE — 84439 ASSAY OF FREE THYROXINE: CPT

## 2025-03-21 LAB — TESTOST SERPL-MCNC: 311 NG/DL

## 2025-03-24 ENCOUNTER — CLINICAL SUPPORT (OUTPATIENT)
Dept: INTERNAL MEDICINE | Age: 50
End: 2025-03-24
Payer: COMMERCIAL

## 2025-03-24 ENCOUNTER — OFFICE VISIT (OUTPATIENT)
Dept: ORTHOPEDIC SURGERY | Facility: CLINIC | Age: 50
End: 2025-03-24
Payer: COMMERCIAL

## 2025-03-24 VITALS
HEIGHT: 69 IN | OXYGEN SATURATION: 96 % | SYSTOLIC BLOOD PRESSURE: 112 MMHG | DIASTOLIC BLOOD PRESSURE: 75 MMHG | BODY MASS INDEX: 39.25 KG/M2 | WEIGHT: 265 LBS | HEART RATE: 66 BPM

## 2025-03-24 DIAGNOSIS — N28.9 RENAL INSUFFICIENCY: ICD-10-CM

## 2025-03-24 DIAGNOSIS — M19.012 PRIMARY OSTEOARTHRITIS OF LEFT SHOULDER: ICD-10-CM

## 2025-03-24 DIAGNOSIS — M75.102 TEAR OF LEFT ROTATOR CUFF, UNSPECIFIED TEAR EXTENT, UNSPECIFIED WHETHER TRAUMATIC: ICD-10-CM

## 2025-03-24 DIAGNOSIS — Z12.5 SCREENING FOR PROSTATE CANCER: ICD-10-CM

## 2025-03-24 DIAGNOSIS — M25.512 LEFT SHOULDER PAIN, UNSPECIFIED CHRONICITY: Primary | ICD-10-CM

## 2025-03-24 LAB
ANION GAP SERPL CALCULATED.3IONS-SCNC: 8.3 MMOL/L (ref 5–15)
BUN SERPL-MCNC: 20 MG/DL (ref 6–20)
BUN/CREAT SERPL: 17.5 (ref 7–25)
CALCIUM SPEC-SCNC: 9.5 MG/DL (ref 8.6–10.5)
CHLORIDE SERPL-SCNC: 103 MMOL/L (ref 98–107)
CO2 SERPL-SCNC: 25.7 MMOL/L (ref 22–29)
CREAT SERPL-MCNC: 1.14 MG/DL (ref 0.76–1.27)
EGFRCR SERPLBLD CKD-EPI 2021: 77.9 ML/MIN/1.73
GLUCOSE SERPL-MCNC: 97 MG/DL (ref 65–99)
POTASSIUM SERPL-SCNC: 5 MMOL/L (ref 3.5–5.2)
PSA SERPL-MCNC: 1.72 NG/ML (ref 0–4)
SODIUM SERPL-SCNC: 137 MMOL/L (ref 136–145)

## 2025-03-24 PROCEDURE — 36415 COLL VENOUS BLD VENIPUNCTURE: CPT

## 2025-03-24 PROCEDURE — G0103 PSA SCREENING: HCPCS

## 2025-03-24 PROCEDURE — 99203 OFFICE O/P NEW LOW 30 MIN: CPT | Performed by: STUDENT IN AN ORGANIZED HEALTH CARE EDUCATION/TRAINING PROGRAM

## 2025-03-24 PROCEDURE — 80048 BASIC METABOLIC PNL TOTAL CA: CPT

## 2025-03-24 NOTE — PROGRESS NOTES
"Chief Complaint  Pain and Initial Evaluation of the Left Shoulder    Subjective          Hardik Woo presents to [facility] for an evaluation  of his left shoulder.     History of Present Illness    The patient presents here today for an evaluation  of his left shoulder. He states his left shoulder has been bothering him for several years but has gradually gotten worse over the last few weeks. He denies any prior surgery, injury, falls or prior surgery to his left shoulder. He has a burning sensation to his shoulder. He has pain with reaching and lifting. He has noticed decrease in strength to his left shoulder. He has been taking Mobic with little relief. He went to his family doctor where he had x-rays done on his left shoulder. He is currently scheduled for an MRI on 04/11/25.     No Known Allergies     Social History     Socioeconomic History    Marital status:    Tobacco Use    Smoking status: Never    Smokeless tobacco: Never   Vaping Use    Vaping status: Never Used   Substance and Sexual Activity    Alcohol use: Yes     Alcohol/week: 4.0 standard drinks of alcohol     Types: 4 Cans of beer per week     Comment: I dont drink a lot    Drug use: Never    Sexual activity: Yes     Partners: Female     Birth control/protection: Vasectomy        I reviewed the patient's chief complaint, history of present illness, review of systems, past medical history, surgical history, family history, social history, medications, and allergy list.     REVIEW OF SYSTEMS    Constitutional: Denies fevers, chills, weight loss  Cardiovascular: Denies chest pain, shortness of breath  Skin: Denies rashes, acute skin changes  Neurologic: Denies headache, loss of consciousness  MSK: left shoulder pain       Objective   Vital Signs:   /75   Pulse 66   Ht 175.3 cm (69\")   Wt 120 kg (265 lb)   SpO2 96%   BMI 39.13 kg/m²     Body mass index is 39.13 kg/m².    Physical Exam    General: " Alert. No acute distress.   Left upper extremity: forward elevation  to 160 degrees, external rotation  at the side to 45 degrees, internal rotation to low lumbar, 3/5 supraspinatus strength , 4/5 infraspinatus, 5/5 subscap, negative  impingement, mild pain with Crockett, neurovascularly intact, sensation intact to the medial, radial and ulnar nerve, non tender to the acromioclavicular joint     Procedures    Imaging Results (Most Recent)       Procedure Component Value Units Date/Time    XR Shoulder 2+ View Left [[id number]] Resulted: 03/24/25 0939     Updated: 03/24/25 0939    Narrative:      Indications: Left shoulder pain    Views: AP, Grashey, Scap Y, axillary left shoulder    Findings: No fractures are seen.  Mild glenohumeral and moderate AC joint   arthrosis.  Glenohumeral joint reduced.    Comparative Data: No comparative data available                     Assessment and Plan        XR Shoulder 2+ View Left  Result Date: 3/24/2025  Narrative: Indications: Left shoulder pain Views: AP, Grashey, Scap Y, axillary left shoulder Findings: No fractures are seen.  Mild glenohumeral and moderate AC joint arthrosis.  Glenohumeral joint reduced. Comparative Data: No comparative data available       Diagnoses and all orders for this visit:    1. Left shoulder pain, unspecified chronicity (Primary)  -     XR Shoulder 2+ View Left    2. Primary osteoarthritis of left shoulder    3. Tear of left rotator cuff, unspecified tear extent, unspecified whether traumatic      The patient presents here today for an evaluation  of his left shoulder. X-rays were obtained in the office today and these were reviewed today.     He will continue the mobic and getting the MRI on 04/11/25. He will avoid heavy overhead lifting, pulling and pushing.     Home exercises given today.    Call or return if worsening symptoms.    Scribed for Hunter Waldrop MD by Gabriella Ron  03/24/2025   08:15 EDT         Follow Up       4 weeks     Patient was  given instructions and counseling regarding his condition or for health maintenance advice. Please see specific information pulled into the AVS if appropriate.       I have personally performed the services described in this document as scribed by the above individual and it is both accurate and complete. Hunter Waldrop MD 03/24/25 11:23 EDT

## 2025-04-11 ENCOUNTER — HOSPITAL ENCOUNTER (OUTPATIENT)
Dept: MRI IMAGING | Facility: HOSPITAL | Age: 50
Discharge: HOME | End: 2025-04-11
Payer: COMMERCIAL

## 2025-04-11 ENCOUNTER — OFFICE VISIT (OUTPATIENT)
Dept: INTERNAL MEDICINE | Age: 50
End: 2025-04-11
Payer: COMMERCIAL

## 2025-04-11 VITALS
BODY MASS INDEX: 37.95 KG/M2 | WEIGHT: 256.2 LBS | OXYGEN SATURATION: 98 % | DIASTOLIC BLOOD PRESSURE: 76 MMHG | HEIGHT: 69 IN | HEART RATE: 73 BPM | SYSTOLIC BLOOD PRESSURE: 122 MMHG

## 2025-04-11 DIAGNOSIS — M19.012 PRIMARY OSTEOARTHRITIS OF LEFT SHOULDER: ICD-10-CM

## 2025-04-11 DIAGNOSIS — R73.01 IMPAIRED FASTING GLUCOSE: ICD-10-CM

## 2025-04-11 DIAGNOSIS — Z00.00 ANNUAL PHYSICAL EXAM: Primary | ICD-10-CM

## 2025-04-11 DIAGNOSIS — E66.813 CLASS 3 SEVERE OBESITY DUE TO EXCESS CALORIES WITH SERIOUS COMORBIDITY AND BODY MASS INDEX (BMI) OF 40.0 TO 44.9 IN ADULT: ICD-10-CM

## 2025-04-11 DIAGNOSIS — E66.01 CLASS 3 SEVERE OBESITY DUE TO EXCESS CALORIES WITH SERIOUS COMORBIDITY AND BODY MASS INDEX (BMI) OF 40.0 TO 44.9 IN ADULT: ICD-10-CM

## 2025-04-11 PROCEDURE — 99396 PREV VISIT EST AGE 40-64: CPT

## 2025-04-11 PROCEDURE — 73221 MRI JOINT UPR EXTREM W/O DYE: CPT

## 2025-04-11 RX ORDER — MELOXICAM 7.5 MG/1
7.5 TABLET ORAL DAILY
Qty: 30 TABLET | Refills: 2 | Status: SHIPPED | OUTPATIENT
Start: 2025-04-11

## 2025-04-11 NOTE — ASSESSMENT & PLAN NOTE
Patient's (Body mass index is 37.82 kg/m².) indicates that they are obese (BMI >30) with health conditions that include osteoarthritis . Weight is unchanged. BMI  is above average; no BMI management plan is appropriate. We discussed portion control and increasing exercise.     Currently taking semaglutide weekly injections obtain from wellness center.  Continue current medication regimen

## 2025-04-11 NOTE — ASSESSMENT & PLAN NOTE
PSA- UTD yearly order placed   Colonoscopy- scheduled in July 2025   Flu vaccine- 10/2024  COVID-19 vaccine- primary series declines booster.  Lipids-UTD  TDAP-2019  Recommend regular dental/eye exams, regular exercise, healthy diet.  Tobacco use- hx of smokeless tobacco X 14 years ago   Alcohol use- denies

## 2025-04-11 NOTE — PROGRESS NOTES
Chief Complaint  Annual Exam    History of Present Illness  SUBJECTIVE  Hardik Woo presents to [facility] for his annual physical exam.    History of Present Illness  The patient presents for a annual physical and lab review.     He has been experiencing sinus congestion since Sunday, which he attributes to seasonal allergies. He reports no fever but notes ear fullness due to fluid accumulation. He has been managing his symptoms with over-the-counter DayQuil and NyQuil.    He has osteoarthritis in his left shoulder and has an MRI scheduled for this afternoon. He has started taking meloxicam again after the fecal occult blood test came back negative. He has a 90-day supply of meloxicam and will call when he runs out. He has been using over-the-counter Voltaren gel as [email or web address] did not have the prescription version.    He reports no chest pain, shortness of breath, or palpitations. He has been on semaglutide injections since the end of March 2025 or beginning of April 2025, which he pays for out of pocket through a wellness program. He is also taking CoQ10, fish oil, and a multivitamin. He has stopped taking zinc as his multivitamin contains enough of the daily allowance.    He has not been taking a lot of NSAIDs like Tylenol or Motrin. He bumped his creatinine and started eating a lot more protein. He drinks about 10 bottles of water every day. He was sent for a kidney test, which came back normal. He went back the following week and they called him and said everything looked pretty good.        Past Medical History:   Diagnosis Date    Arthritis 2024    In shoulder    Colon polyp 10 years?    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 Have vika and pins    VASECTOMY  9/2000        Current Outpatient Medications:     Coenzyme Q10 (CoQ10) 100 MG capsule, Take 1 capsule by mouth Daily., Disp: , Rfl:     Diclofenac Sodium (VOLTAREN) 1 % gel gel, Apply 4 g topically to the appropriate area as directed 4 (Four) Times a Day As Needed (left shoulder pain)., Disp: 350 g, Rfl: 1    multivitamin with minerals tablet tablet, Take 1 tablet by mouth Daily., Disp: , Rfl:     Semaglutide-Weight Management 1 MG/0.5ML solution auto-injector, Inject 0.25 mL under the skin into the appropriate area as directed 1 (One) Time Per Week., Disp: , Rfl:     meloxicam (Mobic) 7.5 MG tablet, Take 1 tablet by mouth Daily., Disp: 30 tablet, Rfl: 2    OBJECTIVE  Vital Signs:   /76   Pulse 73   Ht 175.3 cm (69.02\")   Wt 116 kg (256 lb 3.2 oz)   SpO2 98%   BMI 37.82 kg/m²    Estimated body mass index is 37.82 kg/m² as calculated from the following:    Height as of this encounter: 175.3 cm (69.02\").    Weight as of this encounter: 116 kg (256 lb 3.2 oz).     Wt Readings from Last 3 Encounters:   04/11/25 116 kg (256 lb 3.2 oz)   03/24/25 120 kg (265 lb)   03/03/25 122 kg (268 lb)     BP Readings from Last 3 Encounters:   04/11/25 122/76   03/24/25 112/75   03/03/25 118/76       Physical Exam  Vitals and nursing note reviewed.   Constitutional:       Appearance: Normal appearance.   HENT:      Head: Normocephalic and atraumatic.      Right Ear: Tympanic membrane, ear canal and external ear normal. A middle ear effusion is present.      Left Ear: Tympanic membrane, ear canal and external ear normal. A middle ear effusion is present.      Mouth/Throat:      Pharynx: " Oropharynx is clear.   Eyes:      Extraocular Movements: Extraocular movements intact.      Conjunctiva/sclera: Conjunctivae normal.      Pupils: Pupils are equal, round, and reactive to light.   Cardiovascular:      Rate and Rhythm: Normal rate and regular rhythm.      Pulses: Normal pulses.      Heart sounds: Normal heart sounds.   Pulmonary:      Effort: Pulmonary effort is normal.      Breath sounds: Normal breath sounds.   Abdominal:      General: Abdomen is flat. Bowel sounds are normal.      Palpations: Abdomen is soft.   Musculoskeletal:         General: Normal range of motion.      Cervical back: Normal range of motion and neck supple.   Skin:     General: Skin is warm and dry.   Neurological:      General: No focal deficit present.      Mental Status: He is alert and oriented to person, place, and time. Mental status is at baseline.   Psychiatric:         Mood and Affect: Mood normal.         Behavior: Behavior normal.         Thought Content: Thought content normal.         Judgment: Judgment normal.          Result Review    [facility]          6/25/2024    06:42 3/13/2025    08:12 3/24/2025    08:47   CMP   Glucose 105  88  97    BUN 19  19  20    Creatinine 0.94  1.44  1.14    EGFR 98.8  58.8  77.9    Sodium 140  141  137    Potassium 4.0  5.0  5.0    Chloride 108  106  103    Calcium 9.0  9.7  9.5    Total Protein 7.0  7.2     Albumin 3.9  3.5     Globulin 3.1  3.7     Total Bilirubin 0.4  0.7     Alkaline Phosphatase 60  62     AST (SGOT) 24  33     ALT (SGPT) 35  33     Albumin/Globulin Ratio 1.3  0.9     BUN/Creatinine Ratio 20.2  13.2  17.5    Anion Gap 9.0  9.3  8.3      CBC          3/13/2025    08:12   CBC   WBC 7.66    RBC 5.29    Hemoglobin 15.3    Hematocrit 46.8    MCV 88.5    MCH 28.9    MCHC 32.7    RDW 13.2    Platelets 221      CBC w/diff          3/13/2025    08:12   CBC w/Diff   WBC 7.66    RBC 5.29    Hemoglobin 15.3    Hematocrit 46.8    MCV 88.5    MCH 28.9    MCHC 32.7    RDW 13.2     Platelets 221    Neutrophil Rel % 51.5    Immature Granulocyte Rel % 0.3    Lymphocyte Rel % 35.4    Monocyte Rel % 8.4    Eosinophil Rel % 3.5    Basophil Rel % 0.9      Lipid Panel          6/25/2024    06:42 3/13/2025    08:12   Lipid Panel   Total Cholesterol 158  167    Triglycerides 76  43    HDL Cholesterol 49  58    VLDL Cholesterol 15  9    LDL Cholesterol  94  100    LDL/HDL Ratio 1.91  1.73      TSH          3/13/2025    08:12   TSH   TSH 2.910      Most Recent A1C          3/13/2025    08:12   HGBA1C Most Recent   Hemoglobin A1C 5.80      PSA          3/24/2025    08:47   PSA   PSA 1.720        No Images in the past 120 days found..     The above data has been reviewed by TAMY Mcdonald 04/11/2025 09:29 EDT.          Patient Care Team:  Yue Yadav APRN as PCP - General (Internal Medicine)            ASSESSMENT & PLAN    Diagnoses and all orders for this visit:    1. Annual physical exam (Primary)  Assessment & Plan:  PSA- UTD yearly order placed   Colonoscopy- scheduled in July 2025   Flu vaccine- 10/2024  COVID-19 vaccine- primary series declines booster.  Lipids-UTD  TDAP-2019  Recommend regular dental/eye exams, regular exercise, healthy diet.  Tobacco use- hx of smokeless tobacco X 14 years ago   Alcohol use- denies     Orders:  -     CBC & Differential; Future  -     Comprehensive Metabolic Panel; Future  -     Lipid Panel; Future  -     Urinalysis With Microscopic - Urine, Clean Catch; Future  -     TSH Rfx On Abnormal To Free T4; Future  -     Hemoglobin A1c; Future    2. Class 3 severe obesity due to excess calories with serious comorbidity and body mass index (BMI) of 40.0 to 44.9 in adult  Assessment & Plan:  Patient's (Body mass index is 37.82 kg/m².) indicates that they are obese (BMI >30) with health conditions that include osteoarthritis . Weight is unchanged. BMI  is above average; no BMI management plan is appropriate. We discussed portion control and increasing exercise.      Currently taking semaglutide weekly injections [facility].  Continue current medication regimen          3. Primary osteoarthritis of left shoulder  Assessment & Plan:  Proving with meloxicam 7.5 mg tablet.  Patient follow-up Ortho  Continue current patient regimen      Orders:  -     meloxicam (Mobic) 7.5 MG tablet; Take 1 tablet by mouth Daily.  Dispense: 30 tablet; Refill: 2    4. Impaired fasting glucose  -     Hemoglobin A1c; Future         Tobacco Use: Low Risk  (4/11/2025)    Patient History     Smoking Tobacco Use: Never     Smokeless Tobacco Use: Never     Passive Exposure: Not on file       Assessment & Plan  1. Health maintenance.  His Prostate-Specific Antigen (PSA) levels are within the normal range. However, there is a slight increase in his Hemoglobin A1c levels, although not to the extent that necessitates medication. His renal function appears to be slightly compromised, potentially due to fasting or increased protein intake. His lipid profile is up-to-date. He is due for a shingles vaccine and is eligible for it post-50 years of age. His tetanus vaccine is current, with the next dose due in 2029. His blood pressure readings are within the normal range. The presence of blood in his stool could be attributed to hemorrhoids. He is advised to maintain a healthy diet and regular exercise regimen. He is also advised to increase his hydration levels. A colonoscopy has been scheduled for 07/2025 to investigate any potential internal issues. If no issues are found, continued monitoring will be the course of action. Regular dental and eye exams are recommended.    2. Obesity.  His Body Mass Index (BMI) indicates obesity. He is advised to engage in regular exercise and maintain a healthy diet.    3. Osteoarthritis in the left shoulder.  An MRI has been scheduled for this afternoon to further investigate the condition. He has resumed taking meloxicam after confirming no gastrointestinal  bleeding. He is advised to monitor for any signs of rectal bleeding and report if it occurs.    4. Sinus congestion.  He reports sinus congestion likely due to seasonal allergies. He is currently taking DayQuil and NyQuil. He is advised to use antihistamines such as Claritin or Zyrtec and Flonase. If symptoms do not improve by next week, he should notify the office.    Follow-up  The patient will follow up in 6 months.      Follow Up     Return in about 6 months (around 10/11/2025) for Recheck.    Please note that portions of this note were completed with a voice recognition program.    Patient was given instructions and counseling regarding his condition or for health maintenance advice. Please see specific information pulled into the AVS if appropriate.   I have reviewed information obtained and documented by others and I have confirmed the accuracy of this documented note.    TAMY Mcdonald    Patient or patient representative verbalized consent for the use of Ambient Listening during the visit with  TAMY Mcdonald for chart documentation. 4/11/2025  09:47 EDT

## 2025-04-11 NOTE — ASSESSMENT & PLAN NOTE
Proving with meloxicam 7.5 mg tablet.  Patient follow-up Ortho  Continue current patient regimen

## 2025-04-11 NOTE — ASSESSMENT & PLAN NOTE
Fecal occult came back negative.  Patient scheduled for colonoscopy July 2025.  Patient to monitor NSAID intake and increase fluids.

## 2025-04-25 ENCOUNTER — OFFICE VISIT (OUTPATIENT)
Dept: ORTHOPEDIC SURGERY | Facility: CLINIC | Age: 50
End: 2025-04-25
Payer: COMMERCIAL

## 2025-04-25 ENCOUNTER — PREP FOR SURGERY (OUTPATIENT)
Dept: OTHER | Facility: HOSPITAL | Age: 50
End: 2025-04-25
Payer: COMMERCIAL

## 2025-04-25 VITALS — WEIGHT: 251 LBS | HEART RATE: 75 BPM | BODY MASS INDEX: 37.18 KG/M2 | OXYGEN SATURATION: 97 % | HEIGHT: 69 IN

## 2025-04-25 DIAGNOSIS — M19.012 PRIMARY OSTEOARTHRITIS OF LEFT SHOULDER: ICD-10-CM

## 2025-04-25 DIAGNOSIS — M19.019 OSTEOARTHRITIS OF AC (ACROMIOCLAVICULAR) JOINT: ICD-10-CM

## 2025-04-25 DIAGNOSIS — M75.122 NONTRAUMATIC COMPLETE TEAR OF LEFT ROTATOR CUFF: Primary | ICD-10-CM

## 2025-05-30 NOTE — PRE-PROCEDURE INSTRUCTIONS
PATIENT INSTRUCTED TO BE:    - NOTHING TO EAT AFTER MIDNIGHT OR CHEW, EXCEPT CAN HAVE CLEAR LIQUIDS 2 HOURS PRIOR TO SURGERY ARRIVAL TIME , NO MORE THAN 8 OZ. (NOTHING RED)     - TO HOLD ALL VITAMINS, SUPPLEMENTS, NSAIDS FOR ONE WEEK PRIOR TO THEIR SURGICAL PROCEDURE    - DO NOT TAKE ________Semaglutide______________ 7 DAYS PRIOR TO PROCEDURE PER ANESTHESIA RECOMMENDATIONS/INSTRUCTIONS     - BATHING INSTRUCTIONS GIVEN    INSTRUCTED NO LOTIONS, JEWELRY, PIERCINGS,  NAIL POLISH, OR DEODORANT DAY OF SURGERY        -INSTRUCTED TO TAKE THE FOLLOWING MEDICATIONS THE DAY OF SURGERY WITH SIPS OF WATER:   none        - DO NOT BRING ANY MEDICATIONS WITH YOU TO THE HOSPITAL THE DAY OF SURGERY, EXCEPT IF USE INHALERS. BRING INHALERS DAY OF SURGERY       - BRING CPAP OR BIPAP TO THE HOSPITAL ONLY IF YOU ARE SPENDING THE NIGHT    - DO NOT SMOKE OR VAPE 24 HOURS PRIOR TO PROCEDURE PER ANESTHESIA REQUEST     -MAKE SURE YOU HAVE A RIDE HOME OR SOMEONE TO STAY WITH YOU THE DAY OF THE PROCEDURE AFTER YOU GO HOME     - FOLLOW ANY OTHER INSTRUCTIONS GIVEN TO YOU BY YOUR SURGEON'S OFFICE.     COME TO [facility]/ [facility], [facility] FLOOR. CHECK IN AT THE DESK FOR REGISTRATION/SURGERY    - YOU WILL RECEIVE A PHONE CALL THE DAY PRIOR TO SURGERY BETWEEN 1PM AND 4 PM WITH ARRIVAL TIME, IF YOUR SURGERY IS ON A MONDAY YOU WILL RECEIVE A CALL THE FRIDAY PRIOR TO SURGERY DATE    - BRING CASH OR CREDIT CARD FOR COPAYMENT OF MEDICATIONS AFTER SURGERY IF YOU USE THE HOSPITAL PHARMACY (MEDS TO BED)    - PREADMISSION TESTING NURSE KRYSTEN TOVAR [phone] IF HAVE ANY QUESTIONS     -PATIENT PROVIDED THE NUMBER FOR PREOP SURGICAL DEPT IF HAD QUESTIONS AFTER HOURS PRIOR TO SURGERY ([phone]).  INFORMED PT IF NO ANSWER, LEAVE A MESSAGE AND SOMEONE WILL RETURN THEIR CALL       PATIENT VERBALIZED UNDERSTANDING

## 2025-06-02 ENCOUNTER — ANESTHESIA EVENT (OUTPATIENT)
Dept: PERIOP | Facility: HOSPITAL | Age: 50
End: 2025-06-02
Payer: COMMERCIAL

## 2025-06-03 ENCOUNTER — HOSPITAL ENCOUNTER (OUTPATIENT)
Facility: HOSPITAL | Age: 50
Setting detail: HOSPITAL OUTPATIENT SURGERY
Discharge: HOME | End: 2025-06-03
Attending: STUDENT IN AN ORGANIZED HEALTH CARE EDUCATION/TRAINING PROGRAM | Admitting: STUDENT IN AN ORGANIZED HEALTH CARE EDUCATION/TRAINING PROGRAM
Payer: COMMERCIAL

## 2025-06-03 ENCOUNTER — ANESTHESIA EVENT CONVERTED (OUTPATIENT)
Dept: ANESTHESIOLOGY | Facility: HOSPITAL | Age: 50
End: 2025-06-03
Payer: COMMERCIAL

## 2025-06-03 ENCOUNTER — ANESTHESIA (OUTPATIENT)
Dept: PERIOP | Facility: HOSPITAL | Age: 50
End: 2025-06-03
Payer: COMMERCIAL

## 2025-06-03 VITALS
HEIGHT: 69 IN | HEART RATE: 75 BPM | DIASTOLIC BLOOD PRESSURE: 74 MMHG | TEMPERATURE: 97.7 F | RESPIRATION RATE: 15 BRPM | WEIGHT: 247.58 LBS | BODY MASS INDEX: 36.67 KG/M2 | OXYGEN SATURATION: 94 % | SYSTOLIC BLOOD PRESSURE: 122 MMHG

## 2025-06-03 DIAGNOSIS — M75.122 NONTRAUMATIC COMPLETE TEAR OF LEFT ROTATOR CUFF: ICD-10-CM

## 2025-06-03 DIAGNOSIS — M19.019 OSTEOARTHRITIS OF AC (ACROMIOCLAVICULAR) JOINT: ICD-10-CM

## 2025-06-03 DIAGNOSIS — M19.012 PRIMARY OSTEOARTHRITIS OF LEFT SHOULDER: ICD-10-CM

## 2025-06-03 PROCEDURE — 25010000002 MIDAZOLAM PER 1MG: Performed by: ANESTHESIOLOGY

## 2025-06-03 PROCEDURE — 25810000003 LACTATED RINGERS PER 1000 ML: Performed by: ANESTHESIOLOGY

## 2025-06-03 PROCEDURE — 29828 SHO ARTHRS SRG BICP TENODSIS: CPT | Performed by: STUDENT IN AN ORGANIZED HEALTH CARE EDUCATION/TRAINING PROGRAM

## 2025-06-03 PROCEDURE — 29827 SHO ARTHRS SRG RT8TR CUF RPR: CPT | Performed by: NURSE PRACTITIONER

## 2025-06-03 PROCEDURE — 25010000002 ESMOLOL 100 MG/10ML SOLUTION

## 2025-06-03 PROCEDURE — 25010000002 CEFAZOLIN PER 500 MG: Performed by: STUDENT IN AN ORGANIZED HEALTH CARE EDUCATION/TRAINING PROGRAM

## 2025-06-03 PROCEDURE — 29828 SHO ARTHRS SRG BICP TENODSIS: CPT | Performed by: NURSE PRACTITIONER

## 2025-06-03 PROCEDURE — C1713 ANCHOR/SCREW BN/BN,TIS/BN: HCPCS | Performed by: STUDENT IN AN ORGANIZED HEALTH CARE EDUCATION/TRAINING PROGRAM

## 2025-06-03 PROCEDURE — 25010000002 FENTANYL CITRATE (PF) 50 MCG/ML SOLUTION

## 2025-06-03 PROCEDURE — S0260 H&P FOR SURGERY: HCPCS | Performed by: STUDENT IN AN ORGANIZED HEALTH CARE EDUCATION/TRAINING PROGRAM

## 2025-06-03 PROCEDURE — 25010000002 LIDOCAINE PF 2% 2 % SOLUTION

## 2025-06-03 PROCEDURE — L3670 SO ACRO/CLAV CAN WEB PRE OTS: HCPCS | Performed by: STUDENT IN AN ORGANIZED HEALTH CARE EDUCATION/TRAINING PROGRAM

## 2025-06-03 PROCEDURE — 29826 SHO ARTHRS SRG DECOMPRESSION: CPT | Performed by: STUDENT IN AN ORGANIZED HEALTH CARE EDUCATION/TRAINING PROGRAM

## 2025-06-03 PROCEDURE — 29827 SHO ARTHRS SRG RT8TR CUF RPR: CPT | Performed by: STUDENT IN AN ORGANIZED HEALTH CARE EDUCATION/TRAINING PROGRAM

## 2025-06-03 PROCEDURE — 25010000002 SUGAMMADEX 200 MG/2ML SOLUTION

## 2025-06-03 PROCEDURE — 93010 ELECTROCARDIOGRAM REPORT: CPT | Performed by: INTERNAL MEDICINE

## 2025-06-03 PROCEDURE — 25010000002 PROPOFOL 10 MG/ML EMULSION

## 2025-06-03 PROCEDURE — 25010000002 DEXAMETHASONE PER 1 MG: Performed by: ANESTHESIOLOGY

## 2025-06-03 PROCEDURE — 29826 SHO ARTHRS SRG DECOMPRESSION: CPT | Performed by: NURSE PRACTITIONER

## 2025-06-03 PROCEDURE — 93005 ELECTROCARDIOGRAM TRACING: CPT | Performed by: ANESTHESIOLOGY

## 2025-06-03 PROCEDURE — 25010000002 ONDANSETRON PER 1 MG

## 2025-06-03 DEVICE — SUT/ANCH FIBERTAK/RC/2.6 SFT SP 1.7MM/FIBERTAPE/LP BLU: Type: IMPLANTABLE DEVICE | Site: SHOULDER | Status: FUNCTIONAL

## 2025-06-03 DEVICE — SUT/ANCH PUSHLOCK BIOCOMP 3.5X19.5: Type: IMPLANTABLE DEVICE | Site: SHOULDER | Status: FUNCTIONAL

## 2025-06-03 DEVICE — SUT/ANCH BIOCOMP SWIVELOCK/SP KNOTLSS NMBR2/BLU 4.75X24.5MM: Type: IMPLANTABLE DEVICE | Site: SHOULDER | Status: FUNCTIONAL

## 2025-06-03 DEVICE — SYS IMP SUT/ANCH TENOD/SHLDR LOOPNTAK/KNOTLESS 4.75MM: Type: IMPLANTABLE DEVICE | Site: SHOULDER | Status: FUNCTIONAL

## 2025-06-03 RX ORDER — MIDAZOLAM HYDROCHLORIDE 2 MG/2ML
2 INJECTION, SOLUTION INTRAMUSCULAR; INTRAVENOUS ONCE
Status: COMPLETED | OUTPATIENT
Start: 2025-06-03 | End: 2025-06-03

## 2025-06-03 RX ORDER — SODIUM CHLORIDE, SODIUM LACTATE, POTASSIUM CHLORIDE, CALCIUM CHLORIDE 600; 310; 30; 20 MG/100ML; MG/100ML; MG/100ML; MG/100ML
20 INJECTION, SOLUTION INTRAVENOUS CONTINUOUS PRN
Status: DISCONTINUED | OUTPATIENT
Start: 2025-06-03 | End: 2025-06-03 | Stop reason: HOSPADM

## 2025-06-03 RX ORDER — EPHEDRINE SULFATE 50 MG/ML
INJECTION INTRAVENOUS AS NEEDED
Status: DISCONTINUED | OUTPATIENT
Start: 2025-06-03 | End: 2025-06-03 | Stop reason: SURG

## 2025-06-03 RX ORDER — BUPIVACAINE HYDROCHLORIDE AND EPINEPHRINE 5; 5 MG/ML; UG/ML
INJECTION, SOLUTION EPIDURAL; INTRACAUDAL; PERINEURAL
Status: COMPLETED | OUTPATIENT
Start: 2025-06-03 | End: 2025-06-03

## 2025-06-03 RX ORDER — ACETAMINOPHEN 500 MG
1000 TABLET ORAL ONCE
Status: COMPLETED | OUTPATIENT
Start: 2025-06-03 | End: 2025-06-03

## 2025-06-03 RX ORDER — FENTANYL CITRATE 50 UG/ML
INJECTION, SOLUTION INTRAMUSCULAR; INTRAVENOUS AS NEEDED
Status: DISCONTINUED | OUTPATIENT
Start: 2025-06-03 | End: 2025-06-03 | Stop reason: SURG

## 2025-06-03 RX ORDER — OXYCODONE HYDROCHLORIDE 5 MG/1
5 TABLET ORAL
Status: DISCONTINUED | OUTPATIENT
Start: 2025-06-03 | End: 2025-06-03 | Stop reason: HOSPADM

## 2025-06-03 RX ORDER — PETROLATUM,WHITE
OINTMENT IN PACKET (GRAM) TOPICAL AS NEEDED
Status: DISCONTINUED | OUTPATIENT
Start: 2025-06-03 | End: 2025-06-03 | Stop reason: SURG

## 2025-06-03 RX ORDER — ONDANSETRON 2 MG/ML
4 INJECTION INTRAMUSCULAR; INTRAVENOUS ONCE AS NEEDED
Status: DISCONTINUED | OUTPATIENT
Start: 2025-06-03 | End: 2025-06-03 | Stop reason: HOSPADM

## 2025-06-03 RX ORDER — OXYCODONE AND ACETAMINOPHEN 5; 325 MG/1; MG/1
1 TABLET ORAL EVERY 4 HOURS PRN
Qty: 30 TABLET | Refills: 0 | Status: SHIPPED | OUTPATIENT
Start: 2025-06-03

## 2025-06-03 RX ORDER — PROMETHAZINE HYDROCHLORIDE 12.5 MG/1
25 TABLET ORAL ONCE AS NEEDED
Status: DISCONTINUED | OUTPATIENT
Start: 2025-06-03 | End: 2025-06-03 | Stop reason: HOSPADM

## 2025-06-03 RX ORDER — EPHEDRINE SULFATE 50 MG/ML
INJECTION, SOLUTION INTRAVENOUS AS NEEDED
Status: DISCONTINUED | OUTPATIENT
Start: 2025-06-03 | End: 2025-06-03 | Stop reason: SURG

## 2025-06-03 RX ORDER — DOCUSATE SODIUM 100 MG/1
100 CAPSULE, LIQUID FILLED ORAL 2 TIMES DAILY PRN
Qty: 20 CAPSULE | Refills: 0 | Status: SHIPPED | OUTPATIENT
Start: 2025-06-03

## 2025-06-03 RX ORDER — ONDANSETRON 2 MG/ML
INJECTION INTRAMUSCULAR; INTRAVENOUS AS NEEDED
Status: DISCONTINUED | OUTPATIENT
Start: 2025-06-03 | End: 2025-06-03 | Stop reason: SURG

## 2025-06-03 RX ORDER — ROCURONIUM BROMIDE 10 MG/ML
INJECTION, SOLUTION INTRAVENOUS AS NEEDED
Status: DISCONTINUED | OUTPATIENT
Start: 2025-06-03 | End: 2025-06-03 | Stop reason: SURG

## 2025-06-03 RX ORDER — ONDANSETRON 4 MG/1
4 TABLET, FILM COATED ORAL EVERY 8 HOURS PRN
Qty: 30 TABLET | Refills: 0 | Status: SHIPPED | OUTPATIENT
Start: 2025-06-03

## 2025-06-03 RX ORDER — DEXAMETHASONE SODIUM PHOSPHATE 4 MG/ML
INJECTION, SOLUTION INTRA-ARTICULAR; INTRALESIONAL; INTRAMUSCULAR; INTRAVENOUS; SOFT TISSUE
Status: COMPLETED | OUTPATIENT
Start: 2025-06-03 | End: 2025-06-03

## 2025-06-03 RX ORDER — PROPOFOL 10 MG/ML
VIAL (ML) INTRAVENOUS AS NEEDED
Status: DISCONTINUED | OUTPATIENT
Start: 2025-06-03 | End: 2025-06-03 | Stop reason: SURG

## 2025-06-03 RX ORDER — DEXMEDETOMIDINE HYDROCHLORIDE 100 UG/ML
INJECTION, SOLUTION INTRAVENOUS AS NEEDED
Status: DISCONTINUED | OUTPATIENT
Start: 2025-06-03 | End: 2025-06-03 | Stop reason: SURG

## 2025-06-03 RX ORDER — LIDOCAINE HYDROCHLORIDE 20 MG/ML
INJECTION, SOLUTION EPIDURAL; INFILTRATION; INTRACAUDAL; PERINEURAL AS NEEDED
Status: DISCONTINUED | OUTPATIENT
Start: 2025-06-03 | End: 2025-06-03 | Stop reason: SURG

## 2025-06-03 RX ORDER — DIPHENHYDRAMINE HYDROCHLORIDE 50 MG/ML
12.5 INJECTION, SOLUTION INTRAMUSCULAR; INTRAVENOUS ONCE AS NEEDED
Status: DISCONTINUED | OUTPATIENT
Start: 2025-06-03 | End: 2025-06-03 | Stop reason: HOSPADM

## 2025-06-03 RX ORDER — ESMOLOL HYDROCHLORIDE 10 MG/ML
INJECTION INTRAVENOUS AS NEEDED
Status: DISCONTINUED | OUTPATIENT
Start: 2025-06-03 | End: 2025-06-03 | Stop reason: SURG

## 2025-06-03 RX ORDER — PROMETHAZINE HYDROCHLORIDE 25 MG/1
25 SUPPOSITORY RECTAL ONCE AS NEEDED
Status: DISCONTINUED | OUTPATIENT
Start: 2025-06-03 | End: 2025-06-03 | Stop reason: HOSPADM

## 2025-06-03 RX ADMIN — DEXAMETHASONE SODIUM PHOSPHATE 8 MG: 4 INJECTION, SOLUTION INTRAMUSCULAR; INTRAVENOUS at 07:32

## 2025-06-03 RX ADMIN — LIDOCAINE HYDROCHLORIDE 60 MG: 20 INJECTION, SOLUTION EPIDURAL; INFILTRATION; INTRACAUDAL; PERINEURAL at 07:50

## 2025-06-03 RX ADMIN — EPHEDRINE SULFATE 10 MG: 50 INJECTION INTRAVENOUS at 08:05

## 2025-06-03 RX ADMIN — DEXMEDETOMIDINE 8 MCG: 100 INJECTION, SOLUTION, CONCENTRATE INTRAVENOUS at 07:48

## 2025-06-03 RX ADMIN — EPHEDRINE SULFATE 25 MG: 50 INJECTION INTRAVENOUS at 08:10

## 2025-06-03 RX ADMIN — DEXMEDETOMIDINE 8 MCG: 100 INJECTION, SOLUTION, CONCENTRATE INTRAVENOUS at 09:41

## 2025-06-03 RX ADMIN — ROCURONIUM BROMIDE 10 MG: 10 INJECTION, SOLUTION INTRAVENOUS at 09:15

## 2025-06-03 RX ADMIN — PROPOFOL 20 MG: 10 INJECTION, EMULSION INTRAVENOUS at 09:47

## 2025-06-03 RX ADMIN — MIDAZOLAM HYDROCHLORIDE 2 MG: 1 INJECTION, SOLUTION INTRAMUSCULAR; INTRAVENOUS at 07:19

## 2025-06-03 RX ADMIN — FENTANYL CITRATE 50 MCG: 50 INJECTION, SOLUTION INTRAMUSCULAR; INTRAVENOUS at 09:55

## 2025-06-03 RX ADMIN — PROPOFOL 20 MG: 10 INJECTION, EMULSION INTRAVENOUS at 09:44

## 2025-06-03 RX ADMIN — BUPIVACAINE HYDROCHLORIDE AND EPINEPHRINE BITARTRATE 32 ML: 5; .005 INJECTION, SOLUTION EPIDURAL; INTRACAUDAL; PERINEURAL at 07:32

## 2025-06-03 RX ADMIN — ROCURONIUM BROMIDE 10 MG: 10 INJECTION, SOLUTION INTRAVENOUS at 08:51

## 2025-06-03 RX ADMIN — SODIUM CHLORIDE 2 G: 9 INJECTION, SOLUTION INTRAVENOUS at 08:02

## 2025-06-03 RX ADMIN — EPHEDRINE SULFATE 10 MG: 50 INJECTION INTRAVENOUS at 08:40

## 2025-06-03 RX ADMIN — DEXMEDETOMIDINE 20 MCG: 100 INJECTION, SOLUTION, CONCENTRATE INTRAVENOUS at 07:20

## 2025-06-03 RX ADMIN — FENTANYL CITRATE 50 MCG: 50 INJECTION, SOLUTION INTRAMUSCULAR; INTRAVENOUS at 09:49

## 2025-06-03 RX ADMIN — ACETAMINOPHEN 1000 MG: 500 TABLET ORAL at 07:15

## 2025-06-03 RX ADMIN — LIDOCAINE HYDROCHLORIDE 40 MG: 20 INJECTION, SOLUTION EPIDURAL; INFILTRATION; INTRACAUDAL; PERINEURAL at 08:03

## 2025-06-03 RX ADMIN — ONDANSETRON 4 MG: 2 INJECTION INTRAMUSCULAR; INTRAVENOUS at 09:38

## 2025-06-03 RX ADMIN — DEXMEDETOMIDINE 4 MCG: 100 INJECTION, SOLUTION, CONCENTRATE INTRAVENOUS at 09:15

## 2025-06-03 RX ADMIN — SUGAMMADEX 200 MG: 100 INJECTION, SOLUTION INTRAVENOUS at 09:46

## 2025-06-03 RX ADMIN — Medication 0.1 PACKAGE: at 07:53

## 2025-06-03 RX ADMIN — SODIUM CHLORIDE, POTASSIUM CHLORIDE, SODIUM LACTATE AND CALCIUM CHLORIDE 20 ML/HR: 600; 310; 30; 20 INJECTION, SOLUTION INTRAVENOUS at 07:16

## 2025-06-03 RX ADMIN — DEXMEDETOMIDINE 4 MCG: 100 INJECTION, SOLUTION, CONCENTRATE INTRAVENOUS at 08:51

## 2025-06-03 RX ADMIN — ROCURONIUM BROMIDE 70 MG: 10 INJECTION, SOLUTION INTRAVENOUS at 07:50

## 2025-06-03 RX ADMIN — DEXMEDETOMIDINE 8 MCG: 100 INJECTION, SOLUTION, CONCENTRATE INTRAVENOUS at 09:50

## 2025-06-03 RX ADMIN — EPHEDRINE SULFATE 5 MG: 50 INJECTION INTRAVENOUS at 08:15

## 2025-06-03 RX ADMIN — ESMOLOL HYDROCHLORIDE 30 MG: 10 INJECTION, SOLUTION INTRAVENOUS at 07:50

## 2025-06-03 RX ADMIN — PROPOFOL 160 MG: 10 INJECTION, EMULSION INTRAVENOUS at 07:50

## 2025-06-03 RX ADMIN — EPHEDRINE SULFATE 5 MG: 50 INJECTION INTRAVENOUS at 09:29

## 2025-06-03 NOTE — DISCHARGE INSTRUCTIONS
[facility]  Postop Instructions  Outpatient Shoulder Surgery    DIET  Begin with clear liquids and light foods (jello, soups, etc).  Progress to your normal diet if you are not nauseated.  Take pain medicine with food - crackers, bread, etc.    WOUND CARE  Maintain your operative dressing, loosen bandage if swelling or tingling of the elbow, wrist, or hand occurs.  It is normal for the shoulder to bleed and swell from the surgery site.  If blood soaks onto the bandage, do not become alarmed; reinforce with additional dressing.  If blood saturates more than 2 bandages, call [facility].  Remove surgical dressing on the 2nd post-operative day (THURSDAY). If minimal drainage is present, apply bandaids over incisions.  Do not use antibiotic ointment.  To avoid infection, keep surgical incisions clean and dry.  You may shower on the 2nd day (THURSDAY) but do not submerge.    MEDICATIONS  Pain medication is injected into the wound and shoulder joint during surgery.  This will wear off within 8 to 12 hours.  The anesthesiologist's regional nerve block will usually wear off in 18 to 24 hours.  Aleve 500mg 2 times per day may be taken for pain if you do not have a history of bleeding or ulcers.  Some patients will require narcotic pain medication for a short period of time.  This can be taken as per directions on the bottle.  Potential narcotic side effects include: constipation, nausea, vomiting, sleepiness.  If nausea and vomiting continue for more than 12-24 hours, contact the office to have your medication changed.  Do not drive a car or operate machinery while taking the prescription pain medication.  Increase vegetables, whole grains, and water intake to decrease risk of constipation related to pain medications.  Drink a full glass of water with every dose of medication (prescription or over the counter) and take with food.    ACTIVITY  When sleeping or resting, inclined positions (ie recliner chair) and a  pillow under the forearm for support may provide better comfort.  Do not engage in activities which increase pain/swelling (lifting or any repetitive above shoulder-level activities) over the first 7-10 days following surgery or activities where you bring your arm away from your body.  Avoid long periods of sitting (without arm supported) or long distance traveling for 2 weeks.  No driving or operating heavy machinery until you are off all prescription pain medications.  You may return to sedentary work or school 1-3 days after surgery, if pain is tolerable with sling.  SLING  Repairs and Reconstructions:  For the first two weeks, you must wear sling/immobilizer at all times except when doing exercises.  When around people in close proximity or in inclement weather, you should wear sling for protection.    ICE THERAPY  Begin icing immediately after surgery using ice compression machine or cold packs.    EXERCISE   Begin shoulder exercises the following day after surgery unless otherwise instructed by the surgeon (When block has fully worn off and you regain control of your operative arm)  Pendulums, Saws, and Table Slides; 3 x   You may also begin elbow, wrist, and hand range of motion on the first post-operative day about 2-3 times per day.  Formal physical therapy, if needed, will begin 2 - 6 weeks after surgery.      Saws (Forward and back, side to side)                            Pendulums (clockwise and counter)                Table Slides          EMERGENCIES  Contact [facility] if any of the following are present:  Painful swelling or numbness, tingling, color change, or coolness in the wrist or hand  Unrelenting pain  Fever over 101 (It is normal to have a low grade fever for the first day or two following surgery.)   Redness or worsening pain around incisions  Continuous drainage or bleeding from incision (a small amount of drainage is expected)  Difficulty breathing, wheezing  Excessive  nausea/vomiting causing inability to keep anything down for 12-24hours  Inability to urinate    WHAT TO EXPECT AT YOUR FIRST POST OPERATIVE VISIT  Suture/stitches will be removed and new bandage applied if needed.  Follow up X-rays may be taken

## 2025-06-03 NOTE — ANESTHESIA PROCEDURE NOTES
Peripheral Block      Patient reassessed immediately prior to procedure    Patient location during procedure: pre-op  Start time: 6/3/2025 7:26 AM  Stop time: 6/3/2025 7:32 AM  Reason for block: at surgeon's request and post-op pain management  Performed by  Anesthesiologist: Jonny Silva MD  Preanesthetic Checklist  Completed: patient identified, IV checked, site marked, risks and benefits discussed, surgical consent, monitors and equipment checked, pre-op evaluation and timeout performed  Prep:  Pt Position: supine (HOB elevated)  Sterile barriers:cap, washed/disinfected hands, sterile barriers, gloves, mask, partial drape and alcohol skin prep  Prep: ChloraPrep  Patient monitoring: blood pressure monitoring, continuous pulse oximetry and EKG  Procedure    Sedation: yes  Performed under: local infiltration  Guidance:ultrasound guided and nerve stimulator    ULTRASOUND INTERPRETATION.  Using ultrasound guidance a 22 G gauge needle was placed in close proximity to the brachial plexus nerve, at which point, under ultrasound guidance anesthetic was injected in the area of the nerve and spread of the anesthesia was seen on ultrasound in close proximity thereto.  There were no abnormalities seen on ultrasound; a digital image was taken; and the patient tolerated the procedure with no complications. Images:still images obtained, printed/placed on chart  Loss of twitch: 0.5 mA  Laterality:left  Block Type:interscalene  Injection Technique:single-shot  Needle Type:echogenic  Needle Gauge:22 G (2in)  Resistance on Injection: none    Medications Used: bupivacaine-EPINEPHrine PF (MARCAINE w/EPI) 0.5% -[id number] injection - Injection, Interscalene   32 mL - 6/3/2025 7:32:00 AM  dexamethasone (DECADRON) injection 4 mg/mL - Injection, Interscalene   8 mg - 6/3/2025 7:32:00 AM      Medications  Comment:Precedex 40mcg added to above solution mixture    Post Assessment  Injection Assessment: negative aspiration for heme, no  paresthesia on injection and incremental injection  Patient Tolerance:comfortable throughout block  Complications:no  Additional Notes  The block or continuous infusion is requested by the referring physician for management of postoperative pain, or pain related to a procedure. Ultrasound guidance (deemed medically necessary). Painless injection, pt was awake and conversant during the procedure without complications. Needle and surrounding structures visualized throughout procedure. No adverse reactions or complications seen during this period. Post-procedure image showed no signs of complication, and anatomy was consistent with an uncomplicated nerve blockade.  Performed by: Jonny Silva MD

## 2025-06-03 NOTE — OP NOTE
SHOULDER ARTHROSCOPY WITH ROTATOR CUFF REPAIR  Procedure Report    Patient Name:  Hardik Woo  YOB: 1975    Date of Surgery:  6/3/2025     Indications: Hardik is a 49-year-old male with a history of a left rotator cuff tear.  We discussed the risk, benefits, indications, and alternatives to left shoulder arthroscopy with rotator cuff repair, possible biceps tenotomy versus tenodesis, possible subacromial decompression and distal clavicle excision.  He elected to proceed with surgical management and consent was obtained.    Pre-op Diagnosis:   Nontraumatic complete tear of left rotator cuff [M75.122]  Osteoarthritis of AC (acromioclavicular) joint [M19.019]  Primary osteoarthritis of left shoulder [M19.012]       Post-Op Diagnosis Codes:     * Nontraumatic complete tear of left rotator cuff [M75.122]     * Osteoarthritis of AC (acromioclavicular) joint [M19.019]     * Primary osteoarthritis of left shoulder [M19.012]    Procedure:  Procedure(s):  Left Shoulder Arthroscopy with Rotator cuff repair, biceps tenodesis, subacromial decompression     Staff:  Surgeon(s):  Hunter Waldrop MD    Assistant: Genesis King APRN    Anesthesia: General    Estimated Blood Loss: minimal    Implants:    Implant Name Type Inv. Item Serial No.  Lot No. LRB No. Used Action   SYS IMP SUT/ANCH TENOD/SHLDR LOOPNTAK/KNOTLESS 4.75MM - [id number] Implant SYS IMP SUT/ANCH TENOD/SHLDR LOOPNTAK/KNOTLESS 4.75MM  [employer] [id number] Left 1 Implanted   SUT/ANCH BIOCOMP SWIVELOCK/SP KNOTLSS NMBR2/LYSSA 4.75X24.5MM - [id number] Implant SUT/ANCH BIOCOMP SWIVELOCK/SP KNOTLSS NMBR2/LYSSA 4.75X24.5MM  [employer] [id number] Left 1 Implanted   SUT/ANCH FIBERTAK/RC/2.6 SFT SP 1.7MM/FIBERTAPE/LP LYSSA - [id number] Implant SUT/ANCH FIBERTAK/RC/2.6 SFT SP 1.7MM/FIBERTAPE/LP LYSSA  [employer] [id number] Left 1 Implanted   SUT/ANCH PUSHLOCK BIOCOMP 3.5X19.5 - [id number] Implant SUT/ANCH PUSHLOCK BIOCOMP 3.5X19.5  [employer] [id number] Left 1  Implanted       Specimen:          None        Findings: Degenerative superior labral tearing with unstable biceps, full-thickness anterior supraspinatus tear, subacromial enthesophyte    Complications: None    Description of Procedure: The patient was met in the preoperative holding area and the operative extremity was marked.  A preoperative peripheral nerve block was performed by the anesthesia team.  The patient was transported the operating room and laid supine on the operating room table.  General anesthesia was induced.  2 g of preoperative Ancef were administered.  The patient was then carefully placed in the lateral decubitus position with all extremities well-padded.  An arm holding device was used during the case.  The left upper extremity was prepped and draped in the usual sterile fashion.  A timeout was taken to ensure the appropriate patient, procedure, and procedural site.  All were in agreement.  I began by marking the superficial landmarks over the shoulder.  A vertical incision was made for a posterior viewing portal and the arthroscope was inserted into the glenohumeral joint.  A diagnostic arthroscopy was performed.  Cartilage in the glenohumeral joint was predominantly intact.  There was tendinopathy of the upper border of the subscap but it was otherwise intact.  There was tearing at the anterior border of the supraspinatus tendon.  A vertical incision was made for an anterior working portal after needle localization.  I inserted the arthroscopic shaver.  I debrided the interval tissue to improve visualization.  I probed the biceps and it was unstable.  I elected perform a biceps tenodesis.  I utilized the loop and tack technique.  A link suture was passed around and then through the biceps tendon.  The biceps was freed from the superior labrum.  I then inserted a swivel lock anchor high in the bicipital groove to secure the tendon.  This was completed without complication.  The superior  labrum was debrided back to a stable border.  The arthroscope was then removed from the glenohumeral joint and inserted into the subacromial space.  A vertical incision for a lateral working portal was created after needle localization.  A subacromial bursectomy was performed.  The rotator cuff tear was identified.  This was far anterior on the supraspinatus.  The footprint was cleared of soft tissue utilizing the shaver and cautery device.  I then planned my repair.  I elected for a double row repair.  A stab incision was made off of the acromial margin.  I then drilled and inserted a rotator cuff fiber tack anchor off of the articular margin.  I utilized both sliding and static sutures.  Sliding sutures were tied to generate medial compression.  I then moved laterally.  Utilized a swivel lock anchor to secure my medial row sutures.  This was completed without complication.  I was satisfied with the reduction of the rotator cuff tear.  No implant complications were noted.  I then utilized the arthroscopic shaver to perform a subacromial decompression, elevating the anterior enthesophyte on the acromion.  This was carried back to the level of the AC joint.  The distal clavicle was left intact.  All arthroscopic instrumentation was removed.  Wounds were closed with 3-0 nylon in interrupted fashion.  Wounds were dressed with Xeroform, 4 x 4, ABD, and tape.  The patient was placed into an abduction sling.  She woke from anesthesia without complication and was transferred to the recovery room in stable condition.  All surgical counts were correct.    Assistant: Genesis King APRN  was responsible for performing the following activities: Retraction, Suction, Irrigation, Suturing, Closing, and Placing Dressing and their skilled assistance was necessary for the success of this case.    Hunter Waldrop MD     Date: 6/3/2025  Time: 09:41 EDT

## 2025-06-03 NOTE — ANESTHESIA POSTPROCEDURE EVALUATION
Patient: Hardik Woo    Procedure Summary       Date: 06/03/25 Room / Location: [facility] OR [address] [place] [address] OR [facility]    Anesthesia Start: 0744 Anesthesia Stop: 1002    Procedure: Left Shoulder Arthroscopy with Rotator cuff repair, biceps tenodesis, distal clavicle excision, subacromial decompression: Surgery using a joint camera to fix torn tendon and biceps in left shoulder (Left: Shoulder) Diagnosis:       Nontraumatic complete tear of left rotator cuff      Osteoarthritis of AC (acromioclavicular) joint      Primary osteoarthritis of left shoulder      (Nontraumatic complete tear of left rotator cuff [M75.122])      (Osteoarthritis of AC (acromioclavicular) joint [M19.019])      (Primary osteoarthritis of left shoulder [M19.012])    Surgeons: Hunter Waldrop MD Provider: Jonny Silva MD    Anesthesia Type: general with block ASA Status: 2            Anesthesia Type: general with block    Vitals  Vitals Value Taken Time   /87 06/03/25 10:13   Temp 36.2 °C (97.2 °F) 06/03/25 09:57   Pulse 76 06/03/25 10:27   Resp 18 06/03/25 10:05   SpO2 91 % 06/03/25 10:27   Vitals shown include unfiled device data.        Post Anesthesia Care and Evaluation    Patient location during evaluation: bedside  Patient participation: complete - patient participated  Level of consciousness: awake  Pain score: 0  Pain management: adequate    Airway patency: patent  Anesthetic complications: No anesthetic complications  PONV Status: none  Cardiovascular status: acceptable and stable  Respiratory status: acceptable  Hydration status: acceptable

## 2025-06-03 NOTE — H&P
[facility]   PREOPERATIVE HISTORY AND PHYSICAL    Patient Name:Hardik Woo  : 1975  MRN: [id number]  Primary Care Physician: Yue Yadav APRN  Date of admission: 6/3/2025    Subjective   Subjective     Chief Complaint: preoperative evaluation    History of Present Illness  Hardik Woo is a 49 y.o. male who presents for preoperative evaluation. He is scheduled for Left Shoulder Arthroscopy with Rotator cuff repair, possible biceps tenotomy versus tenodesis, possible distal clavicle excision, possible subacromial decompression: Surgery using a joint camera to fix torn tendon and biceps in left shoulder (Left)    Review of Systems     14 point review of systems negative except as noted above in HPI    Personal History     Past Medical History:   Diagnosis Date    Arthritis     In shoulder    Colon polyp 10 years?    I have regular colonoscopy    Hypogonadism male 2019    Ingrown toenail     Obesity     Screening for colon cancer        Past Surgical History:   Procedure Laterality Date    ANKLE OPEN REDUCTION INTERNAL FIXATION Left 21    COLONOSCOPY      FRACTURE SURGERY  19    Broke both lower bones in left leg. Have vika and pins    VASECTOMY  2000       Family History: His family history includes Diabetes in his mother; Heart disease in his father, maternal grandfather, and mother; Other in some other family members.     Social History: He  reports that he has never smoked. He has never used smokeless tobacco. He reports that he does not currently use alcohol. He reports that he does not use drugs.    Home Medications:  CoQ10, Diclofenac Sodium, Semaglutide-Weight Management, meloxicam, and multivitamin with minerals    Allergies:  He has no known allergies.    Objective    Objective     Vitals:         Physical Exam    General: Alert, no acute distress  Cardiac: Intact peripheral pulses  Pulmonary: Nonlabored respirations  Left upper extremity:  forward  elevation to 160 degrees, external rotation at the side to 45 degrees, internal rotation to low lumbar, 3/5 supraspinatus strength , 4/5 infraspinatus, 5/5 subscap, negative impingement, mild pain with [place], neurovascularly intact, sensation intact to the medial, radial and ulnar nerve, non tender to the acromioclavicular joint     Assessment & Plan   Assessment / Plan     Brief Patient Summary:  Hardik Woo is a 49 y.o. male who presents for preoperative evaluation.    Pre-Op Diagnosis Codes:      * Nontraumatic complete tear of left rotator cuff [M75.122]     * Osteoarthritis of AC (acromioclavicular) joint [M19.019]     * Primary osteoarthritis of left shoulder [M19.012]    Active Hospital Problems:  Active Hospital Problems    Diagnosis     Nontraumatic complete tear of left rotator cuff     Osteoarthritis of AC (acromioclavicular) joint     Primary osteoarthritis of left shoulder      Plan:   Procedure(s):  Left Shoulder Arthroscopy with Rotator cuff repair, possible biceps tenotomy versus tenodesis, possible distal clavicle excision, possible subacromial decompression: Surgery using a joint camera to fix torn tendon and biceps in left shoulder    The risks, benefits, and alternatives of the procedure including but not limited to bleeding, infection, neurovascular damage, hardware complications, retear, arthritic progression, persistent pain, stiffness, functional limitation, anesthesia risk including mortality, DVT/PE, need for additional procedures, and risks of the anesthesia were discussed in detail with the patient and questions were answered. No guarantees were made or implied. Informed consent was obtained.    Hunter Waldrop MD

## 2025-06-03 NOTE — ANESTHESIA PREPROCEDURE EVALUATION
Anesthesia Evaluation     Patient summary reviewed and Nursing notes reviewed   no history of anesthetic complications:   NPO Solid Status: > 8 hours  NPO Liquid Status: > 2 hours           Airway   Mallampati: II  TM distance: >3 FB  Neck ROM: full  No difficulty expected  Dental      Pulmonary - negative pulmonary ROS and normal exam    breath sounds clear to auscultation  Cardiovascular - negative cardio ROS and normal exam  Exercise tolerance: good (4-7 METS)    ECG reviewed  Rhythm: regular  Rate: normal        Neuro/Psych- negative ROS  GI/Hepatic/Renal/Endo    (+) GI bleeding lower resolved    Musculoskeletal     Abdominal    Substance History - negative use     OB/GYN negative ob/gyn ROS         Other   arthritis,     ROS/Med Hx Other: PAT Nursing Notes unavailable.               EKG nsr rate 69 abnormal r wave progression    Anesthesia Plan    ASA 2     general with block     (Patient understands anesthesia not responsible for dental damage.)  intravenous induction     Anesthetic plan, risks, benefits, and alternatives have been provided, discussed and informed consent has been obtained with: patient.    Use of blood products discussed with patient .    Plan discussed with CRNA.    CODE STATUS:

## 2025-06-08 LAB
QT INTERVAL: 376 MS
QTC INTERVAL: 403 MS

## 2025-06-16 ENCOUNTER — OFFICE VISIT (OUTPATIENT)
Dept: ORTHOPEDIC SURGERY | Facility: CLINIC | Age: 50
End: 2025-06-16
Payer: COMMERCIAL

## 2025-06-16 VITALS
DIASTOLIC BLOOD PRESSURE: 69 MMHG | OXYGEN SATURATION: 96 % | HEART RATE: 76 BPM | BODY MASS INDEX: 36.58 KG/M2 | HEIGHT: 69 IN | WEIGHT: 247 LBS | SYSTOLIC BLOOD PRESSURE: 104 MMHG

## 2025-06-16 DIAGNOSIS — Z48.89 AFTERCARE FOLLOWING SURGERY: Primary | ICD-10-CM

## 2025-06-16 PROCEDURE — 99024 POSTOP FOLLOW-UP VISIT: CPT | Performed by: STUDENT IN AN ORGANIZED HEALTH CARE EDUCATION/TRAINING PROGRAM

## 2025-06-16 RX ORDER — HYDROCODONE BITARTRATE AND ACETAMINOPHEN 5; 325 MG/1; MG/1
1 TABLET ORAL EVERY 6 HOURS PRN
Qty: 20 TABLET | Refills: 0 | Status: SHIPPED | OUTPATIENT
Start: 2025-06-16

## 2025-06-16 RX ORDER — HYDROCODONE BITARTRATE AND ACETAMINOPHEN 5; 325 MG/1; MG/1
1 TABLET ORAL EVERY 6 HOURS PRN
Qty: 20 TABLET | Refills: 0 | Status: SHIPPED | OUTPATIENT
Start: 2025-06-16 | End: 2025-06-16 | Stop reason: SDUPTHER

## 2025-06-16 RX ORDER — IBUPROFEN 200 MG
200 TABLET ORAL EVERY 6 HOURS PRN
COMMUNITY

## 2025-06-16 NOTE — PROGRESS NOTES
"Chief Complaint  Follow-up of the Left Shoulder and Suture / Staple Removal    Subjective          Hardik Woo presents to [facility] for a follow up for his left shoulder.     History of Present Illness    The patient presents here today for a follow up for his left shoulder. He recently underwent a left shoulder arthroscopy with rotator cuff repair, biceps tenodesis, distal clavicle excision, subacromial decompression performed on 06/03/25. He presents to the office today for his two week post-operative appointment where his sutures were removed. He presents today in a sling. He denies any new injury or falls since his surgery.     No Known Allergies     Social History     Socioeconomic History    Marital status:    Tobacco Use    Smoking status: Never    Smokeless tobacco: Never   Vaping Use    Vaping status: Never Used   Substance and Sexual Activity    Alcohol use: Not Currently     Comment: non in last year    Drug use: Never    Sexual activity: Defer        I reviewed the patient's chief complaint, history of present illness, review of systems, past medical history, surgical history, family history, social history, medications, and allergy list.     REVIEW OF SYSTEMS    Constitutional: Denies fevers, chills, weight loss  Cardiovascular: Denies chest pain, shortness of breath  Skin: Denies rashes, acute skin changes  Neurologic: Denies headache, loss of consciousness  MSK: left shoulder pain       Objective   Vital Signs:   /69   Pulse 76   Ht 175.3 cm (69\")   Wt 112 kg (247 lb)   SpO2 96%   BMI 36.48 kg/m²     Body mass index is 36.48 kg/m².    Physical Exam    General: Alert. No acute distress.   Left upper extremity: sutures were removed today in the office, no redness, no active drainage, no signs of infection, limited range of motion secondary to pain to the shoulder, full finger and elbow range of motion, neurovascularly intact, positive  pulses, " sensation intact to the axillar, medial, radial and ulnar nerve     Procedures    Imaging Results (Most Recent)       None                     Assessment and Plan        Peripheral Block  Result Date: 6/3/2025  Narrative: Jonny Silva MD     6/3/2025  7:47 AM Peripheral Block Patient reassessed immediately prior to procedure Patient location during procedure: pre-op Start time: 6/3/2025 7:26 AM Stop time: 6/3/2025 7:32 AM Reason for block: at surgeon's request and post-op pain management Performed by Anesthesiologist: Jonny Silva MD Preanesthetic Checklist Completed: patient identified, IV checked, site marked, risks and benefits discussed, surgical consent, monitors and equipment checked, pre-op evaluation and timeout performed Prep: Pt Position: supine (HOB elevated) Sterile barriers:cap, washed/disinfected hands, sterile barriers, gloves, mask, partial drape and alcohol skin prep Prep: ChloraPrep Patient monitoring: blood pressure monitoring, continuous pulse oximetry and EKG Procedure Sedation: yes Performed under: local infiltration Guidance:ultrasound guided and nerve stimulator ULTRASOUND INTERPRETATION.  Using ultrasound guidance a 22 G gauge needle was placed in close proximity to the brachial plexus nerve, at which point, under ultrasound guidance anesthetic was injected in the area of the nerve and spread of the anesthesia was seen on ultrasound in close proximity thereto.  There were no abnormalities seen on ultrasound; a digital image was taken; and the patient tolerated the procedure with no complications. Images:still images obtained, printed/placed on chart Loss of twitch: 0.5 mA Laterality:left Block Type:interscalene Injection Technique:single-shot Needle Type:echogenic Needle Gauge:22 G (2in) Resistance on Injection: none Medications Used: bupivacaine-EPINEPHrine PF (MARCAINE w/EPI) 0.5% -[id number] injection - Injection, Interscalene  32 mL - 6/3/2025 7:32:00 AM dexamethasone (DECADRON)  injection 4 mg/mL - Injection, Interscalene  8 mg - 6/3/2025 7:32:00 AM Medications Comment:Precedex 40mcg added to above solution mixture Post Assessment Injection Assessment: negative aspiration for heme, no paresthesia on injection and incremental injection Patient Tolerance:comfortable throughout block Complications:no Additional Notes The block or continuous infusion is requested by the referring physician for management of postoperative pain, or pain related to a procedure. Ultrasound guidance (deemed medically necessary). Painless injection, pt was awake and conversant during the procedure without complications. Needle and surrounding structures visualized throughout procedure. No adverse reactions or complications seen during this period. Post-procedure image showed no signs of complication, and anatomy was consistent with an uncomplicated nerve blockade. Performed by: Jonny Silva MD        Diagnoses and all orders for this visit:    1. Aftercare following surgery left shoulder arthroscopy with rotator cuff repair, biceps tenodesis, distal clavicle excision, subacromial decompression performed on 06/03/25. (Primary)        The patient presents here today for a follow up for his left shoulder arthroscopy.     Incision care was discussed and review with the patient.     Order placed today for physical therapy and will continue the sling.     He will continue current medications for pain control.     Call or return if worsening symptoms.    Scribed for Hunter Waldrop MD by Gabriella Ron  06/16/2025   13:20 EDT         Follow Up     4 weeks     Patient was given instructions and counseling regarding his condition or for health maintenance advice. Please see specific information pulled into the AVS if appropriate.     I have personally performed the services described in this document as scribed by the above individual and it is both accurate and complete. Hunter Waldrop MD 06/16/25 14:51 EDT

## 2025-07-07 ENCOUNTER — PREP FOR SURGERY (OUTPATIENT)
Dept: OTHER | Facility: HOSPITAL | Age: 50
End: 2025-07-07
Payer: COMMERCIAL

## 2025-07-07 ENCOUNTER — CLINICAL SUPPORT (OUTPATIENT)
Dept: GASTROENTEROLOGY | Facility: CLINIC | Age: 50
End: 2025-07-07
Payer: COMMERCIAL

## 2025-07-07 DIAGNOSIS — Z86.0101 HX OF ADENOMATOUS COLONIC POLYPS: ICD-10-CM

## 2025-07-07 DIAGNOSIS — Z12.11 ENCOUNTER FOR SCREENING COLONOSCOPY: Primary | ICD-10-CM

## 2025-07-07 DIAGNOSIS — D12.6 TUBULAR ADENOMA OF COLON: ICD-10-CM

## 2025-07-07 RX ORDER — SODIUM, POTASSIUM,MAG SULFATES 17.5-3.13G
2 SOLUTION, RECONSTITUTED, ORAL ORAL TAKE AS DIRECTED
Qty: 177 ML | Refills: 0 | Status: SHIPPED | OUTPATIENT
Start: 2025-07-07

## 2025-07-07 NOTE — PROGRESS NOTES
Hardik BATRES Chilo  1975  49 y.o.    Reason for call: 5yr colon recall-2020 Dr Ghosh, hx colon polyps-hyperplastic, 2008 polyps-TA  If recall, please list diagnosis:   Prep prescribed: Suprep  Prep instructions reviewed with patient and sent to patient via [employer]  Is the patient currently on any injectable or oral medications for weight loss or diabetes? Yes  Clearance needed? No  If yes, what clearance is needed? N/A  Clearance has been requested from   The patient has been scheduled for: Colonoscopy     Hardik Woo is aware they have been scheduled for a screening colonoscopy. Patient has expressed they are not having any symptoms at all.     Family history of colon cancer? No  If yes, indicate relative:   Tentative Procedure Date: 8.25.2025    Date/Place of last Scope: 2020/Dr Ghosh  Able to obtain report? yes  Family History   Problem Relation Age of Onset    Heart disease Mother     Diabetes Mother     Hyperlipidemia Mother     Heart disease Father     Heart disease Maternal Grandfather     Other Other         liz neoplasn, malignant    Other Other         no family history of colorectal cancer    Malig Hyperthermia Neg Hx     Colon cancer Neg Hx     Esophageal cancer Neg Hx     Liver cancer Neg Hx     Rectal cancer Neg Hx     Stomach cancer Neg Hx      Past Medical History:   Diagnosis Date    Arthritis 2024    In shoulder    Colon polyp 10 years?    I have regular colonoscopy    Diverticulitis of colon 2015    Fracture, finger 1988    Fracture, tibia and fibula 2021    Hypogonadism male 12/22/2019    Ingrown toenail     Irritable bowel syndrome 2000    Obesity     Rotator cuff syndrome 2021    Screening for colon cancer 2014    Tennis elbow 2014     No Known Allergies  Past Surgical History:   Procedure Laterality Date    ANKLE OPEN REDUCTION INTERNAL FIXATION Left 2-20-21    COLONOSCOPY  2011,2020    FRACTURE SURGERY  2-20-21    Broke both lower bones in left leg. Have vika and pins    SHOULDER  ARTHROSCOPY W/ ROTATOR CUFF REPAIR Left 6/3/2025    Procedure: Left Shoulder Arthroscopy with Rotator cuff repair, biceps tenodesis, distal clavicle excision, subacromial decompression: Surgery using a joint camera to fix torn tendon and biceps in left shoulder;  Surgeon: Hunter Waldrop MD;  [place] [facility] [place] [facility] [place]  Service: [place]opedics;  Laterality: Left;    VASECTOMY  9/2000     Social History     Socioeconomic History    Marital status:    Tobacco Use    Smoking status: Never     Passive exposure: Past    Smokeless tobacco: Never   Vaping Use    Vaping status: Never Used   Substance and Sexual Activity    Alcohol use: Not Currently     Comment: non in last year    Drug use: Never    Sexual activity: Yes     Partners: Female     Birth control/protection: Vasectomy       Current Outpatient Medications:     Coenzyme Q10 (CoQ10) 100 MG capsule, Take 1 capsule by mouth Daily., Disp: , Rfl:     ibuprofen (ADVIL,MOTRIN) 200 MG tablet, Take 1 tablet by mouth Every 6 (Six) Hours As Needed for Mild Pain., Disp: , Rfl:     multivitamin with minerals tablet tablet, Take 1 tablet by mouth Daily., Disp: , Rfl:     Semaglutide-Weight Management 1 MG/0.5ML solution auto-injector, Inject 0.25 mL under the skin into the appropriate area as directed 1 (One) Time Per Week., Disp: , Rfl:

## 2025-07-14 ENCOUNTER — OFFICE VISIT (OUTPATIENT)
Dept: ORTHOPEDIC SURGERY | Facility: CLINIC | Age: 50
End: 2025-07-14
Payer: COMMERCIAL

## 2025-07-14 VITALS — BODY MASS INDEX: 36.58 KG/M2 | HEIGHT: 69 IN | WEIGHT: 247 LBS

## 2025-07-14 DIAGNOSIS — Z48.89 AFTERCARE FOLLOWING SURGERY: Primary | ICD-10-CM

## 2025-07-14 PROCEDURE — 99024 POSTOP FOLLOW-UP VISIT: CPT | Performed by: STUDENT IN AN ORGANIZED HEALTH CARE EDUCATION/TRAINING PROGRAM

## 2025-07-14 NOTE — PROGRESS NOTES
"Chief Complaint  Follow-up of the Left Shoulder    Subjective          Hardik Woo presents to [facility] for a follow up for his left shoulder.     History of Present Illness    The patient presents here today for a follow up for his left shoulder. He underwent a left shoulder arthroscopy with rotator cuff repair, biceps tenodesis, distal clavicle excision, subacromial decompression performed on 06/03/25. He presents to the office today for his 6 week post-operative appointment. He has been attending outpatient physical therapy and states he is overall doing well. He denies any new injury or falls since his last visit.     No Known Allergies     Social History     Socioeconomic History    Marital status:    Tobacco Use    Smoking status: Never     Passive exposure: Past    Smokeless tobacco: Never   Vaping Use    Vaping status: Never Used   Substance and Sexual Activity    Alcohol use: Not Currently     Comment: non in last year    Drug use: Never    Sexual activity: Yes     Partners: Female     Birth control/protection: Vasectomy        I reviewed the patient's chief complaint, history of present illness, review of systems, past medical history, surgical history, family history, social history, medications, and allergy list.     REVIEW OF SYSTEMS    Constitutional: Denies fevers, chills, weight loss  Cardiovascular: Denies chest pain, shortness of breath  Skin: Denies rashes, acute skin changes  Neurologic: Denies headache, loss of consciousness  MSK: left shoulder pain       Objective   Vital Signs:   Ht 175.3 cm (69\")   Wt 112 kg (247 lb)   BMI 36.48 kg/m²     Body mass index is 36.48 kg/m².    Physical Exam    General: Alert. No acute distress.   Left upper extremity: Well healed surgicial incision, forward elevation  to 120 degrees, external rotation at the side to 30 degrees, internal rotation to back pocket, 5/5 rotator cuff strength, neurovascularly intact, " positive  pulses, sensation intact to the medial, radial and ulnar nerve, positive  pulses.     Procedures    Imaging Results (Most Recent)       None                     Assessment and Plan        No results found.     Diagnoses and all orders for this visit:    1. Aftercare following surgery left shoulder arthroscopy with rotator cuff repair, biceps tenodesis, distal clavicle excision, subacromial decompression performed on 06/03/25. (Primary)        The patient presents here today for a follow up for his left shoulder.     He is overall doing well and will continue outpatient physical therapy and current medications for pain control.     Call or return if worsening symptoms.    Scribed for Hunter Waldrop MD by Gabriella Ron  07/14/2025   08:11 EDT         Follow Up       6 weeks     Patient was given instructions and counseling regarding his condition or for health maintenance advice. Please see specific information pulled into the AVS if appropriate.       I have personally performed the services described in this document as scribed by the above individual and it is both accurate and complete. Hunter Waldrop MD 07/14/25 09:09 EDT

## 2025-08-12 ENCOUNTER — TELEPHONE (OUTPATIENT)
Dept: GASTROENTEROLOGY | Facility: CLINIC | Age: 50
End: 2025-08-12
Payer: COMMERCIAL

## 2025-08-22 ENCOUNTER — ANESTHESIA EVENT (OUTPATIENT)
Dept: GASTROENTEROLOGY | Facility: HOSPITAL | Age: 50
End: 2025-08-22
Payer: COMMERCIAL

## 2025-08-25 ENCOUNTER — HOSPITAL ENCOUNTER (OUTPATIENT)
Facility: HOSPITAL | Age: 50
Setting detail: HOSPITAL OUTPATIENT SURGERY
Discharge: HOME | End: 2025-08-25
Attending: INTERNAL MEDICINE | Admitting: INTERNAL MEDICINE
Payer: COMMERCIAL

## 2025-08-25 ENCOUNTER — ANESTHESIA (OUTPATIENT)
Dept: GASTROENTEROLOGY | Facility: HOSPITAL | Age: 50
End: 2025-08-25
Payer: COMMERCIAL

## 2025-08-25 VITALS
HEART RATE: 76 BPM | OXYGEN SATURATION: 97 % | WEIGHT: 245.37 LBS | RESPIRATION RATE: 15 BRPM | TEMPERATURE: 97.9 F | BODY MASS INDEX: 36.24 KG/M2 | DIASTOLIC BLOOD PRESSURE: 69 MMHG | SYSTOLIC BLOOD PRESSURE: 121 MMHG

## 2025-08-25 PROCEDURE — 45378 DIAGNOSTIC COLONOSCOPY: CPT | Performed by: INTERNAL MEDICINE

## 2025-08-25 PROCEDURE — 25810000003 LACTATED RINGERS PER 1000 ML: Performed by: NURSE ANESTHETIST, CERTIFIED REGISTERED

## 2025-08-25 PROCEDURE — 25010000002 PROPOFOL 10 MG/ML EMULSION: Performed by: NURSE ANESTHETIST, CERTIFIED REGISTERED

## 2025-08-25 PROCEDURE — 25010000002 LIDOCAINE PF 2% 2 % SOLUTION: Performed by: NURSE ANESTHETIST, CERTIFIED REGISTERED

## 2025-08-25 RX ORDER — SODIUM CHLORIDE, SODIUM LACTATE, POTASSIUM CHLORIDE, CALCIUM CHLORIDE 600; 310; 30; 20 MG/100ML; MG/100ML; MG/100ML; MG/100ML
30 INJECTION, SOLUTION INTRAVENOUS CONTINUOUS
Status: DISCONTINUED | OUTPATIENT
Start: 2025-08-25 | End: 2025-08-25 | Stop reason: HOSPADM

## 2025-08-25 RX ORDER — PROPOFOL 10 MG/ML
VIAL (ML) INTRAVENOUS AS NEEDED
Status: DISCONTINUED | OUTPATIENT
Start: 2025-08-25 | End: 2025-08-25 | Stop reason: SURG

## 2025-08-25 RX ORDER — LIDOCAINE HYDROCHLORIDE 20 MG/ML
INJECTION, SOLUTION EPIDURAL; INFILTRATION; INTRACAUDAL; PERINEURAL AS NEEDED
Status: DISCONTINUED | OUTPATIENT
Start: 2025-08-25 | End: 2025-08-25 | Stop reason: SURG

## 2025-08-25 RX ADMIN — SODIUM CHLORIDE, POTASSIUM CHLORIDE, SODIUM LACTATE AND CALCIUM CHLORIDE: 600; 310; 30; 20 INJECTION, SOLUTION INTRAVENOUS at 08:41

## 2025-08-25 RX ADMIN — PROPOFOL 80 MG: 10 INJECTION, EMULSION INTRAVENOUS at 08:42

## 2025-08-25 RX ADMIN — PROPOFOL 200 MCG/KG/MIN: 10 INJECTION, EMULSION INTRAVENOUS at 08:43

## 2025-08-25 RX ADMIN — LIDOCAINE HYDROCHLORIDE 60 MG: 20 INJECTION, SOLUTION EPIDURAL; INFILTRATION; INTRACAUDAL; PERINEURAL at 08:42

## 2025-08-26 ENCOUNTER — TELEPHONE (OUTPATIENT)
Dept: GASTROENTEROLOGY | Facility: CLINIC | Age: 50
End: 2025-08-26
Payer: COMMERCIAL

## (undated) DEVICE — SUT PROLN 0 CT1 30IN 8424H

## (undated) DEVICE — SHOULDER P.A.D. III: Brand: DEROYAL

## (undated) DEVICE — SHOULDER ARTHROSCOPY-LF: Brand: MEDLINE INDUSTRIES, INC.

## (undated) DEVICE — SLV DISTRACTION STAR VELCRO XL DISP

## (undated) DEVICE — BLD CUT FORMLA AGGR PLS 4.0MM

## (undated) DEVICE — APPL CHLORAPREP HI/LITE 26ML ORNG

## (undated) DEVICE — CANN TRPL DAM 7X7MM

## (undated) DEVICE — SLV SCD KN/LEN ADJ EXPRSS BLENDED MD 1P/U

## (undated) DEVICE — SOL IRR NACL 0.9PCT 3000ML

## (undated) DEVICE — BURSECTOR 5.5 MM X 125 MM.  DO NOT RESTERILIZE, DO NOT USE IF PACKAGE IS DAMAGED, KEEP DRY, KEEP AWAY FROM DIRECT SUNLIGHT: Brand: CROSSBLADE

## (undated) DEVICE — SOL IRRG H2O PL/BG 1000ML STRL

## (undated) DEVICE — STERILE POLYISOPRENE POWDER-FREE SURGICAL GLOVES: Brand: PROTEXIS

## (undated) DEVICE — GLV SURG SENSICARE PI ORTHO SZ6.5 LF STRL

## (undated) DEVICE — SOLIDIFIER LIQLOC PLS 1500CC BT

## (undated) DEVICE — Device

## (undated) DEVICE — TBG INFLOW/OUTFLOW DUALWAVE 1P/U

## (undated) DEVICE — SUT ETHLN 3-0 FS118IN 663H

## (undated) DEVICE — KT INST FOR FIBERTAK ANCHR 2.6MM DISP

## (undated) DEVICE — THE STERILE LIGHT HANDLE COVER IS USED WITH STERIS SURGICAL LIGHTING AND VISUALIZATION SYSTEMS.

## (undated) DEVICE — CANN PASSPORT BUTN 8MM 4CM

## (undated) DEVICE — DEFENDO AIR WATER SUCTION AND BIOPSY VALVE KIT: Brand: DEFENDO AIR/WATER/SUCTION AND BIOPSY VALVE

## (undated) DEVICE — GLV SURG SENSICARE PI ORTHO SZ8 LF STRL

## (undated) DEVICE — STERILE POLYISOPRENE POWDER-FREE SURGICAL GLOVES WITH EMOLLIENT COATING: Brand: PROTEXIS

## (undated) DEVICE — TP NDL HD/SCORPION W/MEGALOADER 1P/U

## (undated) DEVICE — LINER SURG CANSTR SXN S/RIGD 1500CC

## (undated) DEVICE — DRESSING,GAUZE,XEROFORM,CURAD,1"X8",ST: Brand: CURAD

## (undated) DEVICE — 90-S CRUISE, SUCTION PROBE, NON-BENDABLE, MAX CUT LEVEL 1: Brand: SERFAS ENERGY

## (undated) DEVICE — INTENDED FOR TISSUE SEPARATION, AND OTHER PROCEDURES THAT REQUIRE A SHARP SURGICAL BLADE TO PUNCTURE OR CUT.: Brand: BARD-PARKER ® CARBON RIB-BACK BLADES

## (undated) DEVICE — CONN JET HYDRA H20 AUXILIARY DISP